# Patient Record
Sex: MALE | Race: WHITE | Employment: OTHER | URBAN - METROPOLITAN AREA
[De-identification: names, ages, dates, MRNs, and addresses within clinical notes are randomized per-mention and may not be internally consistent; named-entity substitution may affect disease eponyms.]

---

## 2018-07-18 LAB
LDL-C, EXTERNAL: 60
PSA, EXTERNAL: 0.8

## 2018-08-03 ENCOUNTER — HOSPITAL ENCOUNTER (OUTPATIENT)
Dept: LAB | Age: 83
Discharge: HOME | End: 2018-08-03
Payer: MEDICARE

## 2018-08-03 ENCOUNTER — OFFICE VISIT (OUTPATIENT)
Dept: ENDOCRINOLOGY | Age: 83
End: 2018-08-03

## 2018-08-03 VITALS
RESPIRATION RATE: 14 BRPM | WEIGHT: 162 LBS | SYSTOLIC BLOOD PRESSURE: 163 MMHG | HEART RATE: 54 BPM | DIASTOLIC BLOOD PRESSURE: 75 MMHG | BODY MASS INDEX: 24.55 KG/M2 | TEMPERATURE: 96.8 F | HEIGHT: 68 IN

## 2018-08-03 DIAGNOSIS — E03.4 HYPOTHYROIDISM DUE TO ACQUIRED ATROPHY OF THYROID: ICD-10-CM

## 2018-08-03 DIAGNOSIS — E87.5 HYPERKALEMIA: ICD-10-CM

## 2018-08-03 DIAGNOSIS — E04.2 MULTINODULAR GOITER: Primary | ICD-10-CM

## 2018-08-03 PROCEDURE — 80048 BASIC METABOLIC PNL TOTAL CA: CPT

## 2018-08-03 PROCEDURE — 36415 COLL VENOUS BLD VENIPUNCTURE: CPT

## 2018-08-03 RX ORDER — LISINOPRIL 10 MG/1
TABLET ORAL DAILY
COMMUNITY
End: 2020-09-21

## 2018-08-03 RX ORDER — LEVOTHYROXINE SODIUM 50 UG/1
TABLET ORAL
COMMUNITY
End: 2020-09-21

## 2018-08-03 RX ORDER — FENOFIBRATE 160 MG/1
160 TABLET ORAL DAILY
COMMUNITY
End: 2020-09-21

## 2018-08-03 NOTE — PROGRESS NOTES
HISTORY OF PRESENT ILLNESS Lorenzo Bonilla is a 80 y.o. male. HPI Initial visit for  MNG and hypothyroidism Referred by Dr. Eric Mejia He has been on thyroid supplementation for 5 years He is compliant with synthroid HE HAS STAYED EUTHYROID FOR ALL THIS TIME Pt has recently been hospitalized for paroxysmal afib PT HAD USG THYROID AND WAS FOUND TO HAVE THYROID NODULES Past Medical History:  
Diagnosis Date  CAD (coronary artery disease)   
 hypercholesterolemia  Cancer ([address] Prostate  GERD (gastroesophageal reflux disease)  Hypercholesteremia  Hypertension  Hypothyroid  Migraines  Peptic ulcer disease with hemorrhage  6 years ago Had gastric surgery in [place] Social History Social History  Marital status:  Spouse name: N/A  
 Number of children: N/A  
 Years of education: N/A Occupational History  Not on file. Social History Main Topics  Smoking status: Former Smoker  Smokeless tobacco: Never Used  Alcohol use No  
 Drug use: No  
 Sexual activity: No  
 
Other Topics Concern  Not on file Social History Narrative History reviewed. No pertinent family history. Review of Systems Constitutional: Negative. HENT: Negative. Eyes: Negative for pain and redness. Respiratory: Negative. Cardiovascular: Negative for chest pain, palpitations and leg swelling. Gastrointestinal: Negative. Negative for constipation. Genitourinary: Negative. Musculoskeletal: Negative for myalgias. Skin: Negative. Neurological: Negative. Endo/Heme/Allergies: Negative. Psychiatric/Behavioral: Negative for depression and memory loss. The patient does not have insomnia. Physical Exam  
Constitutional: He is oriented to person, place, and time. He appears well-developed and well-nourished. HENT:  
Head: Normocephalic.   
Eyes: Conjunctivae and EOM are normal. Pupils are equal, round, and reactive to light. Neck: Normal range of motion. Neck supple. No JVD present. No tracheal deviation present. No thyromegaly present. Cardiovascular: Normal rate, regular rhythm and normal heart sounds. Pulmonary/Chest: Effort normal and breath sounds normal.  
Abdominal: Soft. Bowel sounds are normal.  
Musculoskeletal: Normal range of motion. Lymphadenopathy:  
  He has no cervical adenopathy. Neurological: He is alert and oriented to person, place, and time. He has normal reflexes. Skin: Skin is warm. Psychiatric: He has a normal mood and affect. REVIEWED INFO  
 
ASSESSMENT and PLAN 1. MNG  : [facility]g from   July 2018  [facility]   
PT HAS 2 SMALL CYSTIC NODULES OF SIZES 3 MM ON LEFT SIDE   
PT IS EUTHYROID AND ASYMPTOMATIC He is reassured that the nodules are so small, that we need not do FNA on them He can f/u in 2-3 years to assess these nodule for growth 2. Hypothyroidism  :  TSH is 2.2 On synthroid 50 mcg a day Pt is taking it compliantly and can continue the same 3. Hyperkalemia : over 6  From   July 16 2018 Will repeat  
( he is on lisinopril ) 4. H/o Paroxysmal atrial arrythmia 5. H/o prostrate cancer  
 
> 50 % of time is spent on counseling Patient voiced understanding her plan of care F/u in 2 years and labs bvn

## 2018-08-03 NOTE — MR AVS SNAPSHOT
[address] [facility] [address] 
[phone] Patient: Leslie Darnell MRN: [id number] RRL:[id number] Visit Information Date & Time Provider Department Dept. Phone Encounter #  
 8/3/2018  1:00 PM Efraín Alvraez MD [facility] Diabetes & Endocrinology [phone] [id number] Follow-up Instructions Return in about 2 years (around 8/3/2020). Upcoming Health Maintenance Date Due DTaP/Tdap/Td series (1 - Tdap) 3/7/1953 ZOSTER VACCINE AGE 60> 1/7/1992 GLAUCOMA SCREENING Q2Y 3/7/1997 Pneumococcal 65+ Low/Medium Risk (1 of 2 - PCV13) 3/7/1997 MEDICARE YEARLY EXAM 3/14/2018 Influenza Age 5 to Adult 8/1/2018 Allergies as of 8/3/2018  Review Complete On: 8/3/2018 By: Efraín Alvarez MD  
  
 Severity Noted Reaction Type Reactions Aspirin  11/03/2011    Other (comments) Ulcers (bleeding) Current Immunizations  Reviewed on 10/8/2011 Name Date Influenza Vaccine Split 10/11/2011  9:30 AM  
  
 Not reviewed this visit You Were Diagnosed With   
  
 Codes Comments Hyperkalemia    -  Primary ICD-10-CM: E87.5 ICD-9-CM: 276.7 Multinodular goiter     ICD-10-CM: E04.2 ICD-9-CM: 241.1 Hypothyroidism due to acquired atrophy of thyroid     ICD-10-CM: E03.4 ICD-9-CM: 244.8, 246.8 Vitals BP Pulse Temp Resp Height(growth percentile) Weight(growth percentile) 163/75 (BP 1 Location: Right arm, BP Patient Position: Sitting) (!) 54 96.8 °F (36 °C) (Oral) 14 5' 8\" (1.727 m) 162 lb (73.5 kg) BMI Smoking Status 24.63 kg/m2 Former Smoker Vitals History BMI and BSA Data Body Mass Index Body Surface Area  
 24.63 kg/m 2 1.88 m 2 Preferred Pharmacy Pharmacy Name Phone [id number], [address] [phone] Your Updated Medication List  
  
   
 This list is accurate as of 8/3/18  1:37 PM.  Always use your most recent med list.  
  
  
  
  
 BUTALBITAL-ACETAMINOP-CAF-COD PO Take  by mouth. CRESTOR 20 mg tablet Generic drug:  rosuvastatin  
  
 fenofibrate 160 mg tablet Commonly known as:  LOFIBRA Take 160 mg by mouth daily. levothyroxine 50 mcg tablet Commonly known as:  SYNTHROID Take  by mouth Daily (before breakfast). lisinopril 10 mg tablet Commonly known as:  Yesenia Needy Take  by mouth daily. tamsulosin 0.4 mg capsule Commonly known as:  FLOMAX Take 0.4 mg by mouth daily. We Performed the Following METABOLIC PANEL, BASIC [91780 CPT(R)] Follow-up Instructions Return in about 2 years (around 8/3/2020). Patient Instructions Synthroid 50  mcg  A day, on empty stomach with water only, no other meds or food or drinks   For next half hour Take any kind of vitamins, calcium, iron   Pills  4 hours later Introducing [facility]! [employer] introduces [employer] patient portal. Now you can access parts of your medical record, email your doctor's office, and request medication refills online. 1. In your internet browser, go to [email or web address] 2. Click on the First Time User? Click Here link in the Sign In box. You will see the New Member Sign Up page. 3. Enter your [employer] Access Code exactly as it appears below. You will not need to use this code after youve completed the sign-up process. If you do not sign up before the expiration date, you must request a new code. · [employer] Access Code: [email or web address] Expires: 11/1/2018  1:37 PM 
 
4. Enter the last four digits of your Social Security Number (xxxx) and Date of Birth (mm/dd/yyyy) as indicated and click Submit. You will be taken to the next sign-up page. 5. Create a [employer] ID.  This will be your [employer] login ID and cannot be changed, so think of one that is secure and easy to remember. 6. Create a [employer] password. You can change your password at any time. 7. Enter your Password Reset Question and Answer. This can be used at a later time if you forget your password. 8. Enter your e-mail address. You will receive e-mail notification when new information is available in [address] 9. Click Sign Up. You can now view and download portions of your medical record. 10. Click the Download Summary menu link to download a portable copy of your medical information. If you have questions, please visit the Frequently Asked Questions section of the [employer] website. Remember, [employer] is NOT to be used for urgent needs. For medical emergencies, dial 911. Now available from your iPhone and Android! Please provide this summary of care documentation to your next provider. Your primary care clinician is listed as Nghia Pritchett. If you have any questions after today's visit, please call [phone].

## 2018-08-03 NOTE — PATIENT INSTRUCTIONS
Synthroid 50  mcg  A day, on empty stomach with water only, no other meds or food or drinks   For next half hour Take any kind of vitamins, calcium, iron   Pills  4 hours later

## 2018-08-03 NOTE — LETTER
8/19/2018 5:27 PM 
 
Patient:  Nadine Soler YOB: 1932 Date of Visit: 8/3/2018 Dear Eugenio Torres MD 
[facility] [place] [address] VIA Facsimile: [phone] 
 : Thank you for referring Mr. Sandra Cazares to me for evaluation/treatment. Below are the relevant portions of my assessment and plan of care. Chief Complaint Patient presents with  New Patient  Thyroid Problem HISTORY OF PRESENT ILLNESS Nadine Soler is a 80 y.o. male. HPI Initial visit for  MNG and hypothyroidism Referred by Dr. Robin Odonnell He has been on thyroid supplementation for 5 years He is compliant with synthroid HE HAS STAYED EUTHYROID FOR ALL THIS TIME Pt has recently been hospitalized for paroxysmal afib PT HAD USG THYROID AND WAS FOUND TO HAVE THYROID NODULES Past Medical History:  
Diagnosis Date  CAD (coronary artery disease)   
 hypercholesterolemia  Cancer ([address] Prostate  GERD (gastroesophageal reflux disease)  Hypercholesteremia  Hypertension  Hypothyroid  Migraines  Peptic ulcer disease with hemorrhage  6 years ago Had gastric surgery in [place] Social History Social History  Marital status:  Spouse name: N/A  
 Number of children: N/A  
 Years of education: N/A Occupational History  Not on file. Social History Main Topics  Smoking status: Former Smoker  Smokeless tobacco: Never Used  Alcohol use No  
 Drug use: No  
 Sexual activity: No  
 
Other Topics Concern  Not on file Social History Narrative History reviewed. No pertinent family history. Review of Systems Constitutional: Negative. HENT: Negative. Eyes: Negative for pain and redness. Respiratory: Negative. Cardiovascular: Negative for chest pain, palpitations and leg swelling. Gastrointestinal: Negative. Negative for constipation. Genitourinary: Negative. Musculoskeletal: Negative for myalgias. Skin: Negative. Neurological: Negative. Endo/Heme/Allergies: Negative. Psychiatric/Behavioral: Negative for depression and memory loss. The patient does not have insomnia. Physical Exam  
Constitutional: He is oriented to person, place, and time. He appears well-developed and well-nourished. HENT:  
Head: Normocephalic. Eyes: Conjunctivae and EOM are normal. Pupils are equal, round, and reactive to light. Neck: Normal range of motion. Neck supple. No JVD present. No tracheal deviation present. No thyromegaly present. Cardiovascular: Normal rate, regular rhythm and normal heart sounds. Pulmonary/Chest: Effort normal and breath sounds normal.  
Abdominal: Soft. Bowel sounds are normal.  
Musculoskeletal: Normal range of motion. Lymphadenopathy:  
  He has no cervical adenopathy. Neurological: He is alert and oriented to person, place, and time. He has normal reflexes. Skin: Skin is warm. Psychiatric: He has a normal mood and affect. REVIEWED INFO  
 
ASSESSMENT and PLAN 1. MNG  : usg from   July 2018  [facility]   
PT HAS 2 SMALL CYSTIC NODULES OF SIZES 3 MM ON LEFT SIDE   
PT IS EUTHYROID AND ASYMPTOMATIC He is reassured that the nodules are so small, that we need not do FNA on them He can f/u in 2-3 years to assess these nodule for growth 2. Hypothyroidism  :  TSH is 2.2 On synthroid 50 mcg a day Pt is taking it compliantly and can continue the same 3. Hyperkalemia : over 6  From   July 16 2018 Will repeat  
( he is on lisinopril ) 4. H/o Paroxysmal atrial arrythmia 5. H/o prostrate cancer  
 
> 50 % of time is spent on counseling Patient voiced understanding her plan of care F/u in 2 years and labs bvn If you have questions, please do not hesitate to call me. I look forward to following Mr. Harmony Carlos along with you. Sincerely, Efraín Alvarez MD

## 2018-08-04 LAB
BUN SERPL-MCNC: 19 MG/DL (ref 8–27)
BUN/CREAT SERPL: 18 (ref 10–24)
CALCIUM SERPL-MCNC: 9.9 MG/DL (ref 8.6–10.2)
CHLORIDE SERPL-SCNC: 106 MMOL/L (ref 96–106)
CO2 SERPL-SCNC: 23 MMOL/L (ref 20–29)
CREAT SERPL-MCNC: 1.05 MG/DL (ref 0.76–1.27)
GLUCOSE SERPL-MCNC: 87 MG/DL (ref 65–99)
POTASSIUM SERPL-SCNC: 5.3 MMOL/L (ref 3.5–5.2)
SODIUM SERPL-SCNC: 141 MMOL/L (ref 134–144)

## 2018-08-08 ENCOUNTER — TELEPHONE (OUTPATIENT)
Dept: ENDOCRINOLOGY | Age: 83
End: 2018-08-08

## 2018-08-08 NOTE — TELEPHONE ENCOUNTER
Other than mild elevation of potassium tests are normal     Avoid  foods which has high potassium -  bananas, oranges, potatoes, spinach and tomatoes.     Dr Joni Rose will address further

## 2020-07-23 VITALS
DIASTOLIC BLOOD PRESSURE: 84 MMHG | BODY MASS INDEX: 23.19 KG/M2 | WEIGHT: 153 LBS | RESPIRATION RATE: 16 BRPM | OXYGEN SATURATION: 99 % | TEMPERATURE: 97.4 F | SYSTOLIC BLOOD PRESSURE: 136 MMHG | HEART RATE: 68 BPM | HEIGHT: 68 IN

## 2020-07-23 VITALS
SYSTOLIC BLOOD PRESSURE: 136 MMHG | HEIGHT: 68 IN | BODY MASS INDEX: 23.19 KG/M2 | WEIGHT: 153 LBS | DIASTOLIC BLOOD PRESSURE: 84 MMHG | HEART RATE: 68 BPM | OXYGEN SATURATION: 99 % | RESPIRATION RATE: 16 BRPM | TEMPERATURE: 97.4 F

## 2020-08-05 ENCOUNTER — TELEPHONE (OUTPATIENT)
Dept: PRIMARY CARE CLINIC | Age: 85
End: 2020-08-05

## 2020-08-05 DIAGNOSIS — F51.01 PRIMARY INSOMNIA: Primary | ICD-10-CM

## 2020-08-05 DIAGNOSIS — R51.9 HEADACHE DISORDER: ICD-10-CM

## 2020-08-05 NOTE — TELEPHONE ENCOUNTER
Rebeka Mauricio called in behalf of her , Jayden Morel,  He needs his, Butalbital-acetaminophen- caffeine 50 mg 325 mg- 40 mg tabs and Zolpidem 10 mg.  [facility] is the pharmacy usesl

## 2020-08-06 RX ORDER — BUTALBITAL, ACETAMINOPHEN AND CAFFEINE 50; 325; 40 MG/1; MG/1; MG/1
1 TABLET ORAL
Qty: 60 TAB | Refills: 0 | Status: SHIPPED | OUTPATIENT
Start: 2020-08-06 | End: 2020-09-21 | Stop reason: SDUPTHER

## 2020-08-06 RX ORDER — ZOLPIDEM TARTRATE 10 MG/1
TABLET ORAL
COMMUNITY
Start: 2020-05-11 | End: 2020-08-06 | Stop reason: SDUPTHER

## 2020-08-06 RX ORDER — ZOLPIDEM TARTRATE 10 MG/1
10 TABLET ORAL
Qty: 30 TAB | Refills: 0 | Status: SHIPPED | OUTPATIENT
Start: 2020-08-06 | End: 2020-09-21 | Stop reason: SDUPTHER

## 2020-08-06 RX ORDER — BUTALBITAL, ACETAMINOPHEN AND CAFFEINE 50; 325; 40 MG/1; MG/1; MG/1
TABLET ORAL
COMMUNITY
Start: 2020-06-16 | End: 2020-08-06 | Stop reason: SDUPTHER

## 2020-08-06 NOTE — TELEPHONE ENCOUNTER
This is the  of the rude patient who came in here on weds. And cussed out alfonso. He has an appt weds but she walked out and told alfonso \"f**ck you\" Not sure what you wanted to do or if he was discharge?

## 2020-09-21 ENCOUNTER — OFFICE VISIT (OUTPATIENT)
Dept: PRIMARY CARE CLINIC | Age: 85
End: 2020-09-21
Payer: MEDICARE

## 2020-09-21 VITALS
SYSTOLIC BLOOD PRESSURE: 128 MMHG | OXYGEN SATURATION: 98 % | WEIGHT: 154.8 LBS | TEMPERATURE: 97.5 F | BODY MASS INDEX: 23.46 KG/M2 | HEART RATE: 66 BPM | HEIGHT: 68 IN | DIASTOLIC BLOOD PRESSURE: 82 MMHG

## 2020-09-21 DIAGNOSIS — R51.9 HEADACHE DISORDER: Chronic | ICD-10-CM

## 2020-09-21 DIAGNOSIS — R30.0 DYSURIA: ICD-10-CM

## 2020-09-21 DIAGNOSIS — I10 ESSENTIAL (PRIMARY) HYPERTENSION: Primary | Chronic | ICD-10-CM

## 2020-09-21 DIAGNOSIS — F51.01 PRIMARY INSOMNIA: Chronic | ICD-10-CM

## 2020-09-21 DIAGNOSIS — N40.1 BPH WITH URINARY OBSTRUCTION: ICD-10-CM

## 2020-09-21 DIAGNOSIS — N13.8 BPH WITH URINARY OBSTRUCTION: ICD-10-CM

## 2020-09-21 DIAGNOSIS — Z86.39 HISTORY OF HYPOTHYROIDISM: ICD-10-CM

## 2020-09-21 PROCEDURE — 99214 OFFICE O/P EST MOD 30 MIN: CPT | Performed by: FAMILY MEDICINE

## 2020-09-21 RX ORDER — AMLODIPINE BESYLATE 5 MG/1
5 TABLET ORAL DAILY
Qty: 90 TAB | Refills: 1 | Status: SHIPPED | OUTPATIENT
Start: 2020-09-21 | End: 2021-03-08 | Stop reason: SDUPTHER

## 2020-09-21 RX ORDER — BUTALBITAL, ACETAMINOPHEN AND CAFFEINE 50; 325; 40 MG/1; MG/1; MG/1
1 TABLET ORAL
Qty: 60 TAB | Refills: 2 | Status: SHIPPED | OUTPATIENT
Start: 2020-09-21 | End: 2020-12-04

## 2020-09-21 RX ORDER — AMLODIPINE BESYLATE 5 MG/1
1 TABLET ORAL DAILY
COMMUNITY
Start: 2020-07-01 | End: 2020-09-21 | Stop reason: SDUPTHER

## 2020-09-21 RX ORDER — ZOLPIDEM TARTRATE 10 MG/1
10 TABLET ORAL
Qty: 90 TAB | Refills: 1 | Status: SHIPPED | OUTPATIENT
Start: 2020-09-21 | End: 2021-03-08 | Stop reason: SDUPTHER

## 2020-09-21 RX ORDER — TAMSULOSIN HYDROCHLORIDE 0.4 MG/1
0.4 CAPSULE ORAL DAILY
Qty: 90 CAP | Refills: 1 | Status: SHIPPED | OUTPATIENT
Start: 2020-09-21 | End: 2021-03-08 | Stop reason: SDUPTHER

## 2020-09-22 LAB
ALBUMIN SERPL-MCNC: 4.3 G/DL (ref 3.6–4.6)
ALBUMIN/GLOB SERPL: 1.9 {RATIO} (ref 1.2–2.2)
ALP SERPL-CCNC: 79 IU/L (ref 39–117)
ALT SERPL-CCNC: 12 IU/L (ref 0–44)
AST SERPL-CCNC: 23 IU/L (ref 0–40)
BASOPHILS # BLD AUTO: 0 X10E3/UL (ref 0–0.2)
BASOPHILS NFR BLD AUTO: 0 %
BILIRUB SERPL-MCNC: 0.2 MG/DL (ref 0–1.2)
BUN SERPL-MCNC: 12 MG/DL (ref 8–27)
BUN/CREAT SERPL: 12 (ref 10–24)
CALCIUM SERPL-MCNC: 10.2 MG/DL (ref 8.6–10.2)
CHLORIDE SERPL-SCNC: 103 MMOL/L (ref 96–106)
CO2 SERPL-SCNC: 21 MMOL/L (ref 20–29)
CREAT SERPL-MCNC: 0.97 MG/DL (ref 0.76–1.27)
EOSINOPHIL # BLD AUTO: 0.1 X10E3/UL (ref 0–0.4)
EOSINOPHIL NFR BLD AUTO: 1 %
ERYTHROCYTE [DISTWIDTH] IN BLOOD BY AUTOMATED COUNT: 12.9 % (ref 11.6–15.4)
GLOBULIN SER CALC-MCNC: 2.3 G/DL (ref 1.5–4.5)
GLUCOSE SERPL-MCNC: 103 MG/DL (ref 65–99)
HCT VFR BLD AUTO: 39 % (ref 37.5–51)
HGB BLD-MCNC: 13.6 G/DL (ref 13–17.7)
IMM GRANULOCYTES # BLD AUTO: 0 X10E3/UL (ref 0–0.1)
IMM GRANULOCYTES NFR BLD AUTO: 0 %
LYMPHOCYTES # BLD AUTO: 2 X10E3/UL (ref 0.7–3.1)
LYMPHOCYTES NFR BLD AUTO: 22 %
MCH RBC QN AUTO: 33.3 PG (ref 26.6–33)
MCHC RBC AUTO-ENTMCNC: 34.9 G/DL (ref 31.5–35.7)
MCV RBC AUTO: 96 FL (ref 79–97)
MONOCYTES # BLD AUTO: 0.8 X10E3/UL (ref 0.1–0.9)
MONOCYTES NFR BLD AUTO: 9 %
MORPHOLOGY BLD-IMP: ABNORMAL
NEUTROPHILS # BLD AUTO: 6.1 X10E3/UL (ref 1.4–7)
NEUTROPHILS NFR BLD AUTO: 68 %
PLATELET # BLD AUTO: 120 X10E3/UL (ref 150–450)
POTASSIUM SERPL-SCNC: 4.9 MMOL/L (ref 3.5–5.2)
PROT SERPL-MCNC: 6.6 G/DL (ref 6–8.5)
RBC # BLD AUTO: 4.08 X10E6/UL (ref 4.14–5.8)
SODIUM SERPL-SCNC: 139 MMOL/L (ref 134–144)
TSH SERPL DL<=0.005 MIU/L-ACNC: 4.6 UIU/ML (ref 0.45–4.5)
WBC # BLD AUTO: 9.1 X10E3/UL (ref 3.4–10.8)

## 2020-09-22 NOTE — PROGRESS NOTES
HISTORY OF PRESENT ILLNESS  Hypertension:  PT taking amlodipine 5 mg. He is follwed by cardiology and he treats as well as doing  the cholesterol levels. Not checking Bp at home. Insomnia - on zolpidem for years w/o incident. No oversedation. Hypothyroid - reports today that he was taken of meds. Migraines - been on fioricet for years. I was able to get him off the codeine version yrs ago. Says sues 1-2 per day/ even some days may skip. BPH - ok on flomax. Helps the urgency and stream.      Alecia Fulton is a 80 y.o. male. Past Medical History:   Diagnosis Date    CAD (coronary artery disease)     hypercholesterolemia    Cancer ([facility] [address]     Prostate    GERD (gastroesophageal reflux disease)     Hypercholesteremia     Hypertension     Hypothyroid     Migraines     Peptic ulcer disease with hemorrhage  6 years ago    Had gastric surgery in [place]     Social History     Tobacco Use    Smoking status: Former Smoker    Smokeless tobacco: Never Used   Substance Use Topics    Alcohol use: No    Drug use: No       Family History   Problem Relation Age of Onset    Heart Attack Other        Review of Systems   Constitutional: Negative. Negative for chills, fever and weight loss. Eyes: Negative for blurred vision. Respiratory: Negative for cough, sputum production and shortness of breath. Cardiovascular: Negative for chest pain, palpitations and leg swelling. Gastrointestinal: Negative for abdominal pain, constipation, diarrhea and heartburn. Genitourinary: Negative for dysuria. Neurological: Positive for headaches. Negative for dizziness, tremors and weakness. Psychiatric/Behavioral: Negative for depression. The patient has insomnia. The patient is not nervous/anxious. Physical Exam  Constitutional:       General: He is not in acute distress. Appearance: Normal appearance. He is obese. He is not ill-appearing.    HENT:      Head: Normocephalic and atraumatic. Eyes:      General: No scleral icterus. Extraocular Movements: Extraocular movements intact. Conjunctiva/sclera: Conjunctivae normal.   Cardiovascular:      Rate and Rhythm: Normal rate and regular rhythm. Pulses: Normal pulses. Heart sounds: Normal heart sounds. Pulmonary:      Effort: Pulmonary effort is normal. No respiratory distress. Breath sounds: Normal breath sounds. No wheezing or rales. Musculoskeletal:      Right lower leg: No edema. Left lower leg: No edema. Lymphadenopathy:      Cervical: No cervical adenopathy. Skin:     General: Skin is warm. Capillary Refill: Capillary refill takes less than 2 seconds. Neurological:      General: No focal deficit present. Mental Status: He is alert and oriented to person, place, and time. Cranial Nerves: No cranial nerve deficit. Psychiatric:         Mood and Affect: Mood normal.         Behavior: Behavior normal.         Thought Content: Thought content normal.         Judgment: Judgment normal.           ASSESSMENT and PLAN  Diagnoses and all orders for this visit:    1. Essential (primary) hypertension  Comments:  at goal  Orders:  -     amLODIPine (NORVASC) 5 mg tablet; Take 1 Tab by mouth daily.  -     METABOLIC PANEL, COMPREHENSIVE  -     CBC WITH AUTOMATED DIFF    2. Headache disorder  Comments:  chrnic on prn fioricet for years. Orders:  -     butalbital-acetaminophen-caffeine (FIORICET, ESGIC) -40 mg per tablet; Take 1 Tab by mouth every six (6) hours as needed for Headache.  -     METABOLIC PANEL, COMPREHENSIVE  -     CBC WITH AUTOMATED DIFF    3. Primary insomnia  Comments:  ok on ambien X yrs  Orders:  -     zolpidem (AMBIEN) 10 mg tablet; Take 1 Tab by mouth nightly as needed for Sleep. Max Daily Amount: 10 mg.    4. BPH with urinary obstruction  -     METABOLIC PANEL, COMPREHENSIVE  -     CBC WITH AUTOMATED DIFF    5.  History of hypothyroidism  Comments:  off meds for some time.  says no longer needed , wife confirmed  Orders:  -     TSH 3RD GENERATION    6. Dysuria  -     tamsulosin (FLOMAX) 0.4 mg capsule; Take 1 Cap by mouth daily. Orders Placed This Encounter    METABOLIC PANEL, COMPREHENSIVE    CBC WITH AUTOMATED DIFF    TSH 3RD GENERATION    DISCONTD: amLODIPine (NORVASC) 5 mg tablet    amLODIPine (NORVASC) 5 mg tablet    butalbital-acetaminophen-caffeine (FIORICET, ESGIC) -40 mg per tablet    tamsulosin (FLOMAX) 0.4 mg capsule    zolpidem (AMBIEN) 10 mg tablet     Follow-up and Dispositions    · Return in about 3 months (around 12/21/2020) for Chronic office visit.

## 2020-09-24 ENCOUNTER — TELEPHONE (OUTPATIENT)
Dept: PRIMARY CARE CLINIC | Age: 85
End: 2020-09-24

## 2020-09-24 NOTE — PROGRESS NOTES
Normal  blood count, normal liver and kidney function and normal blood sugar. Thyroid function  Ok, slight Tsh probably due to kidney function issues.

## 2020-09-25 ENCOUNTER — TELEPHONE (OUTPATIENT)
Dept: PRIMARY CARE CLINIC | Age: 85
End: 2020-09-25

## 2020-09-25 DIAGNOSIS — K59.1 FUNCTIONAL DIARRHEA: Primary | ICD-10-CM

## 2020-09-28 NOTE — TELEPHONE ENCOUNTER
Pt wife states he always have stomach issues. He has had multiple surgery and ruled out multiple medical problems. Pt wife states this always happens. There was no change in any of his medications, on the same medications.  Pt wife states otc medication doesn't work and is asking for something to be prescribed

## 2020-09-29 RX ORDER — DIPHENOXYLATE HYDROCHLORIDE AND ATROPINE SULFATE 2.5; .025 MG/1; MG/1
1 TABLET ORAL
Qty: 60 TAB | Refills: 1 | Status: SHIPPED | OUTPATIENT
Start: 2020-09-29 | End: 2021-06-21 | Stop reason: SDUPTHER

## 2020-09-29 NOTE — TELEPHONE ENCOUNTER
PT has hx of partial gastrectomy an so sounds like he has dumping syndrome. This is actually [id number] [facility] pt not mine. He only saw me bc of scheduling issues. I can only think of imodium which is OTC? Any thoughts?

## 2020-10-12 ENCOUNTER — TELEPHONE (OUTPATIENT)
Dept: PRIMARY CARE CLINIC | Age: 85
End: 2020-10-12

## 2020-10-12 NOTE — TELEPHONE ENCOUNTER
pts wife called really concerned , her  had a appt a few months ago and discussed a little red place on his back now it is huge she says and oozing, he wont go to the er he asked to see you. ..  He also has sabra on arm wife is scared that it may be cancer

## 2020-10-14 ENCOUNTER — OFFICE VISIT (OUTPATIENT)
Dept: PRIMARY CARE CLINIC | Age: 85
End: 2020-10-14
Payer: MEDICARE

## 2020-10-14 VITALS
SYSTOLIC BLOOD PRESSURE: 151 MMHG | OXYGEN SATURATION: 97 % | DIASTOLIC BLOOD PRESSURE: 82 MMHG | RESPIRATION RATE: 18 BRPM | HEIGHT: 68 IN | BODY MASS INDEX: 22.73 KG/M2 | TEMPERATURE: 97.4 F | HEART RATE: 62 BPM | WEIGHT: 150 LBS

## 2020-10-14 DIAGNOSIS — M25.421 EFFUSION OF RIGHT ELBOW: Primary | ICD-10-CM

## 2020-10-14 DIAGNOSIS — L98.9 SKIN LESION: ICD-10-CM

## 2020-10-14 PROCEDURE — G8427 DOCREV CUR MEDS BY ELIG CLIN: HCPCS | Performed by: NURSE PRACTITIONER

## 2020-10-14 PROCEDURE — 20605 DRAIN/INJ JOINT/BURSA W/O US: CPT | Performed by: NURSE PRACTITIONER

## 2020-10-14 PROCEDURE — G8536 NO DOC ELDER MAL SCRN: HCPCS | Performed by: NURSE PRACTITIONER

## 2020-10-14 PROCEDURE — 99212 OFFICE O/P EST SF 10 MIN: CPT | Performed by: NURSE PRACTITIONER

## 2020-10-14 PROCEDURE — G8420 CALC BMI NORM PARAMETERS: HCPCS | Performed by: NURSE PRACTITIONER

## 2020-10-14 PROCEDURE — 1101F PT FALLS ASSESS-DOCD LE1/YR: CPT | Performed by: NURSE PRACTITIONER

## 2020-10-14 PROCEDURE — G8432 DEP SCR NOT DOC, RNG: HCPCS | Performed by: NURSE PRACTITIONER

## 2020-10-14 RX ORDER — LATANOPROST 50 UG/ML
SOLUTION/ DROPS OPHTHALMIC
COMMUNITY
Start: 2020-09-22

## 2020-10-14 RX ORDER — FLUTICASONE PROPIONATE 50 MCG
SPRAY, SUSPENSION (ML) NASAL
COMMUNITY
Start: 2020-07-12 | End: 2021-04-15

## 2020-10-14 RX ORDER — FENOFIBRATE 160 MG/1
TABLET ORAL
COMMUNITY
Start: 2020-09-22 | End: 2020-11-13

## 2020-10-14 RX ORDER — LISINOPRIL 10 MG/1
TABLET ORAL
COMMUNITY
Start: 2020-09-22 | End: 2020-11-13

## 2020-10-14 RX ORDER — TIMOLOL MALEATE 5 MG/ML
SOLUTION/ DROPS OPHTHALMIC
COMMUNITY
Start: 2020-07-12

## 2020-10-14 RX ORDER — LEVOTHYROXINE SODIUM 50 UG/1
TABLET ORAL
COMMUNITY
Start: 2020-09-22 | End: 2020-11-13

## 2020-10-14 RX ORDER — PANTOPRAZOLE SODIUM 40 MG/1
TABLET, DELAYED RELEASE ORAL
COMMUNITY
Start: 2020-09-22 | End: 2021-05-26

## 2020-10-15 NOTE — PROGRESS NOTES
HISTORY OF PRESENT ILLNESS  Jannette Vang is a 80 y.o. male presents for   Chief Complaint   Patient presents with    Elbow Pain    Back Pain     pt fall and pull skin off of back     Several day issue of right elbow swelling after falling     Also left lower back abrasion/spot from a long time ago (about 12 years) with a history of scabbing/healing and scabbing again now skin is elevated and raised              Vitals:    10/14/20 1428   BP: (!) 151/82   BP 1 Location: Left arm   BP Patient Position: Sitting   Pulse: 62   Resp: 18   Temp: 97.4 °F (36.3 °C)   TempSrc: Temporal   SpO2: 97%   Weight: 150 lb (68 kg)   Height: 5' 8\" (1.727 m)      Patient Active Problem List   Diagnosis Code    GI (gastrointestinal bleed) K92.2    Acute posthemorrhagic anemia D62    SVT [facility]supraventricular tachycardia) ([facility]) I47.1    Hypotension, unspecified I95.9    BRETT (acute kidney injury) ([facility]) N17.9    Unspecified hypothyroidism E03.9    Chest pain, unspecified R07.9    Peptic ulcer disease with hemorrhage K27.4    Paroxysmal SVT (supraventricular tachycardia) ([facility]) I47.1    PAF (paroxysmal atrial fibrillation) ([facility]) I48.0    Essential (primary) hypertension I10    Headache disorder R51.9    Primary insomnia F51.01    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, N13.8     Patient Active Problem List    Diagnosis Date Noted    Essential (primary) hypertension 09/21/2020    Headache disorder 09/21/2020    Primary insomnia 09/21/2020    BPH with urinary obstruction 09/21/2020    Paroxysmal SVT (supraventricular tachycardia) ([facility]) 10/08/2011    PAF (paroxysmal atrial fibrillation) ([facility] [address] 10/08/2011    Peptic ulcer disease with hemorrhage     GI (gastrointestinal bleed) 10/07/2011    Acute posthemorrhagic anemia 10/07/2011    SVT (supraventricular tachycardia) ([facility] [address] 10/07/2011    Hypotension, unspecified 10/07/2011    BRETT (acute kidney injury) ([facility] 10/07/2011    Unspecified hypothyroidism 10/07/2011    Chest pain, unspecified 10/07/2011     Current Outpatient Medications   Medication Sig Dispense Refill    fenofibrate (LOFIBRA) 160 mg tablet       fluticasone propionate (FLONASE) 50 mcg/actuation nasal spray       latanoprost (XALATAN) 0.005 % ophthalmic solution       levothyroxine (SYNTHROID) 50 mcg tablet       lisinopriL (PRINIVIL, ZESTRIL) 10 mg tablet       pantoprazole (PROTONIX) 40 mg tablet       timolol (TIMOPTIC) 0.5 % ophthalmic solution       diphenoxylate-atropine (LomotiL) 2.5-0.025 mg per tablet Take 1 Tab by mouth four (4) times daily as needed for Diarrhea. Max Daily Amount: 4 Tabs. 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 90 Tab 1    butalbital-acetaminophen-caffeine (FIORICET, ESGIC) -40 mg per tablet Take 1 Tab by mouth every six (6) hours as needed for Headache. 60 Tab 2    tamsulosin (FLOMAX) 0.4 mg capsule Take 1 Cap by mouth daily. 90 Cap 1    zolpidem (AMBIEN) 10 mg tablet Take 1 Tab by mouth nightly as needed for Sleep.  Max Daily Amount: 10 mg. 90 Tab 1     Allergies   Allergen Reactions    Aspirin Other (comments)     Ulcers (bleeding)    Nsaids (Non-Steroidal Anti-Inflammatory Drug) Other (comments)     Ulcers       Past Medical History:   Diagnosis Date    CAD (coronary artery disease)     hypercholesterolemia    Cancer ([facility] [address]     Prostate    Functional diarrhea     hx of partial gastrectomy    GERD (gastroesophageal reflux disease)     Hypercholesteremia     Hypertension     Hypothyroid     Migraines     Peptic ulcer disease with hemorrhage  6 years ago    Had gastric surgery in [place]     Past Surgical History:   Procedure Laterality Date    ABDOMEN SURGERY PROC UNLISTED      HX GASTRECTOMY  2006    Partial gastectmy     Family History   Problem Relation Age of Onset    Heart Attack Other      Social History     Tobacco Use    Smoking status: Former Smoker    Smokeless tobacco: Never Used   Substance Use Topics    Alcohol use: No           Review of Systems   Constitutional: Negative for fever. Respiratory: Negative for cough. Cardiovascular: Negative for chest pain. Musculoskeletal: Positive for back pain (skin) and joint pain (right elbow with redness and swelling). Neurological: Negative for dizziness. Physical Exam  Vitals signs reviewed. Constitutional:       Appearance: Normal appearance. He is normal weight. HENT:      Head: Normocephalic. Nose: Nose normal.      Mouth/Throat:      Mouth: Mucous membranes are moist.   Eyes:      Extraocular Movements: Extraocular movements intact. Pupils: Pupils are equal, round, and reactive to light. Neck:      Musculoskeletal: Normal range of motion and neck supple. Cardiovascular:      Rate and Rhythm: Normal rate and regular rhythm. Pulses: Normal pulses. Heart sounds: Normal heart sounds. Pulmonary:      Effort: Pulmonary effort is normal.      Breath sounds: Normal breath sounds. Musculoskeletal: Normal range of motion. General: Swelling and tenderness present. Skin:     General: Skin is warm and dry. Findings: Lesion present. Comments: Mushroom like looking 2-3 cm of skin lesion    Neurological:      General: No focal deficit present. Mental Status: He is alert and oriented to person, place, and time. Psychiatric:         Attention and Perception: Attention and perception normal.         Mood and Affect: Affect normal. Mood is depressed. Speech: Speech normal.         Behavior: Behavior normal. Behavior is cooperative. Thought Content: Thought content normal.         Cognition and Memory: Cognition and memory normal.         Judgment: Judgment normal.           ASSESSMENT and PLAN  Diagnoses and all orders for this visit:    1.  Effusion of right elbow  Drained with 1% lido anesthesia betadine prep; drained 5.5 cc of serosanguinous fluid which deflated swelling applied 4\" ace wrap     2. Skin lesion  Sending      There are no diagnoses linked to this encounter.    Tri David, NP

## 2020-10-20 ENCOUNTER — TELEPHONE (OUTPATIENT)
Dept: PRIMARY CARE CLINIC | Age: 85
End: 2020-10-20

## 2020-10-20 NOTE — TELEPHONE ENCOUNTER
They went to dermatology to get the place on his back removed and sent to testing, he still has fluid in his elbow they asked if they should come in and get it drained?

## 2020-10-26 ENCOUNTER — OFFICE VISIT (OUTPATIENT)
Dept: PRIMARY CARE CLINIC | Age: 85
End: 2020-10-26

## 2020-10-26 VITALS
SYSTOLIC BLOOD PRESSURE: 110 MMHG | HEIGHT: 68 IN | BODY MASS INDEX: 22.73 KG/M2 | OXYGEN SATURATION: 97 % | HEART RATE: 57 BPM | RESPIRATION RATE: 18 BRPM | TEMPERATURE: 98.6 F | DIASTOLIC BLOOD PRESSURE: 76 MMHG | WEIGHT: 150 LBS

## 2020-11-13 RX ORDER — FENOFIBRATE 160 MG/1
TABLET ORAL
Qty: 90 TAB | Refills: 1 | Status: SHIPPED | OUTPATIENT
Start: 2020-11-13 | End: 2021-03-08 | Stop reason: SDUPTHER

## 2020-11-13 RX ORDER — LISINOPRIL 10 MG/1
TABLET ORAL
Qty: 90 TAB | Refills: 1 | Status: SHIPPED | OUTPATIENT
Start: 2020-11-13 | End: 2021-03-08 | Stop reason: SDUPTHER

## 2020-11-13 RX ORDER — LEVOTHYROXINE SODIUM 50 UG/1
TABLET ORAL
Qty: 90 TAB | Refills: 1 | Status: SHIPPED | OUTPATIENT
Start: 2020-11-13 | End: 2021-03-08 | Stop reason: SDUPTHER

## 2020-12-03 DIAGNOSIS — R51.9 HEADACHE DISORDER: Chronic | ICD-10-CM

## 2020-12-04 RX ORDER — BUTALBITAL, ACETAMINOPHEN AND CAFFEINE 50; 325; 40 MG/1; MG/1; MG/1
TABLET ORAL
Qty: 60 TAB | Refills: 2 | Status: SHIPPED | OUTPATIENT
Start: 2020-12-04 | End: 2021-01-14 | Stop reason: SDUPTHER

## 2021-01-13 ENCOUNTER — TELEPHONE (OUTPATIENT)
Dept: PRIMARY CARE CLINIC | Age: 86
End: 2021-01-13

## 2021-01-13 DIAGNOSIS — R51.9 HEADACHE DISORDER: Chronic | ICD-10-CM

## 2021-01-13 DIAGNOSIS — R51.9 HEADACHE DISORDER: Primary | Chronic | ICD-10-CM

## 2021-01-13 NOTE — TELEPHONE ENCOUNTER
Patient's wife called and stated that his [facility] called and said that the vendor they worked with will no longer work with them and he needs his butalbital sent into [facility] on [address]. If there are any questions they can be reached at [phone].

## 2021-01-14 RX ORDER — BUTALBITAL, ACETAMINOPHEN AND CAFFEINE 50; 325; 40 MG/1; MG/1; MG/1
TABLET ORAL
Qty: 60 TAB | Refills: 2 | Status: SHIPPED | OUTPATIENT
Start: 2021-01-14 | End: 2021-03-08 | Stop reason: SDUPTHER

## 2021-02-16 ENCOUNTER — TELEPHONE (OUTPATIENT)
Dept: PRIMARY CARE CLINIC | Age: 86
End: 2021-02-16

## 2021-03-08 ENCOUNTER — OFFICE VISIT (OUTPATIENT)
Dept: PRIMARY CARE CLINIC | Age: 86
End: 2021-03-08
Payer: MEDICARE

## 2021-03-08 VITALS
SYSTOLIC BLOOD PRESSURE: 196 MMHG | DIASTOLIC BLOOD PRESSURE: 80 MMHG | HEIGHT: 68 IN | HEART RATE: 58 BPM | TEMPERATURE: 98.6 F | WEIGHT: 153 LBS | RESPIRATION RATE: 16 BRPM | BODY MASS INDEX: 23.19 KG/M2

## 2021-03-08 DIAGNOSIS — I10 ESSENTIAL (PRIMARY) HYPERTENSION: Chronic | ICD-10-CM

## 2021-03-08 DIAGNOSIS — E78.2 MIXED HYPERLIPIDEMIA: ICD-10-CM

## 2021-03-08 DIAGNOSIS — R30.0 DYSURIA: ICD-10-CM

## 2021-03-08 DIAGNOSIS — F51.01 PRIMARY INSOMNIA: Chronic | ICD-10-CM

## 2021-03-08 DIAGNOSIS — E03.9 ACQUIRED HYPOTHYROIDISM: Primary | ICD-10-CM

## 2021-03-08 DIAGNOSIS — N40.0 BENIGN PROSTATIC HYPERPLASIA, UNSPECIFIED WHETHER LOWER URINARY TRACT SYMPTOMS PRESENT: ICD-10-CM

## 2021-03-08 DIAGNOSIS — R51.9 HEADACHE DISORDER: Chronic | ICD-10-CM

## 2021-03-08 PROCEDURE — 1101F PT FALLS ASSESS-DOCD LE1/YR: CPT | Performed by: NURSE PRACTITIONER

## 2021-03-08 PROCEDURE — G8420 CALC BMI NORM PARAMETERS: HCPCS | Performed by: NURSE PRACTITIONER

## 2021-03-08 PROCEDURE — G8432 DEP SCR NOT DOC, RNG: HCPCS | Performed by: NURSE PRACTITIONER

## 2021-03-08 PROCEDURE — 99214 OFFICE O/P EST MOD 30 MIN: CPT | Performed by: NURSE PRACTITIONER

## 2021-03-08 PROCEDURE — G8427 DOCREV CUR MEDS BY ELIG CLIN: HCPCS | Performed by: NURSE PRACTITIONER

## 2021-03-08 PROCEDURE — G8536 NO DOC ELDER MAL SCRN: HCPCS | Performed by: NURSE PRACTITIONER

## 2021-03-08 RX ORDER — LISINOPRIL 10 MG/1
TABLET ORAL
Qty: 90 TAB | Refills: 1 | Status: SHIPPED | OUTPATIENT
Start: 2021-03-08

## 2021-03-08 RX ORDER — TAMSULOSIN HYDROCHLORIDE 0.4 MG/1
0.4 CAPSULE ORAL DAILY
Qty: 90 CAP | Refills: 1 | Status: SHIPPED | OUTPATIENT
Start: 2021-03-08 | End: 2021-05-26

## 2021-03-08 RX ORDER — LEVOTHYROXINE SODIUM 50 UG/1
TABLET ORAL
Qty: 90 TAB | Refills: 1 | Status: SHIPPED | OUTPATIENT
Start: 2021-03-08

## 2021-03-08 RX ORDER — AMLODIPINE BESYLATE 5 MG/1
5 TABLET ORAL DAILY
Qty: 90 TAB | Refills: 1 | Status: SHIPPED | OUTPATIENT
Start: 2021-03-08 | End: 2021-05-26

## 2021-03-08 RX ORDER — ZOLPIDEM TARTRATE 10 MG/1
10 TABLET ORAL
Qty: 90 TAB | Refills: 1 | Status: SHIPPED | OUTPATIENT
Start: 2021-03-08 | End: 2021-08-30 | Stop reason: SDUPTHER

## 2021-03-08 RX ORDER — BUTALBITAL, ACETAMINOPHEN AND CAFFEINE 50; 325; 40 MG/1; MG/1; MG/1
TABLET ORAL
Qty: 60 TAB | Refills: 2 | Status: SHIPPED | OUTPATIENT
Start: 2021-03-08 | End: 2021-05-26

## 2021-03-08 RX ORDER — FENOFIBRATE 160 MG/1
TABLET ORAL
Qty: 90 TAB | Refills: 1 | Status: SHIPPED | OUTPATIENT
Start: 2021-03-08

## 2021-03-08 NOTE — PROGRESS NOTES
HISTORY OF PRESENT ILLNESS  Edward Lugo is a 80 y.o. male presents to the office for medication refill and lab work    . Hypertension: Patients hypertension is well controlled on regimen of Lisinopril and amlodipine. Denies headaches, blurred vision or dizziness. . Hyperlipidemia: Mixed hyperlipidemia well controlled on fenofibrate. Denies any complications from medications. thyroid follow up; denies palpitations chest pain excessive thirst sleeping difficulties, bowel movement issues that are new    Follow up on insomnia.  Does well on ambien 10      Vitals:    03/08/21 1543   BP: (!) 196/80   Pulse: (!) 58   Resp: 16   Temp: 98.6 °F (37 °C)   TempSrc: Temporal   Weight: 153 lb (69.4 kg)   Height: 5' 8\" (1.727 m)     Patient Active Problem List   Diagnosis Code    GI (gastrointestinal bleed) K92.2    Acute posthemorrhagic anemia D62    Hypotension, unspecified I95.9    BRETT (acute kidney injury) ([facility] [address] N17.9    Unspecified hypothyroidism E03.9    Chest pain, unspecified R07.9    Peptic ulcer disease with hemorrhage K27.4    Essential (primary) hypertension I10    Headache disorder R51.9    Primary insomnia F51.01    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, N13.8     Patient Active Problem List    Diagnosis Date Noted    Essential (primary) hypertension 09/21/2020    Headache disorder 09/21/2020    Primary insomnia 09/21/2020    BPH with urinary obstruction 09/21/2020    Peptic ulcer disease with hemorrhage     GI (gastrointestinal bleed) 10/07/2011    Acute posthemorrhagic anemia 10/07/2011    Hypotension, unspecified 10/07/2011    BRETT (acute kidney injury) ([facility] [address] 10/07/2011    Unspecified hypothyroidism 10/07/2011    Chest pain, unspecified 10/07/2011     Current Outpatient Medications   Medication Sig Dispense Refill    butalbital-acetaminophen-caffeine (FIORICET, ESGIC) -40 mg per tablet TAKE 1 TABLET BY MOUTH  EVERY 6 HOURS AS NEEDED FOR HEADACHE(S) 60 Tab 2    lisinopriL (PRINIVIL, ZESTRIL) 10 mg tablet TAKE 1 TABLET BY MOUTH  DAILY 90 Tab 1    fenofibrate (LOFIBRA) 160 mg tablet TAKE 1 TABLET BY MOUTH  DAILY 90 Tab 1    levothyroxine (SYNTHROID) 50 mcg tablet TAKE 1 TABLET BY MOUTH  DAILY 90 Tab 1    fluticasone propionate (FLONASE) 50 mcg/actuation nasal spray       latanoprost (XALATAN) 0.005 % ophthalmic solution       pantoprazole (PROTONIX) 40 mg tablet       timolol (TIMOPTIC) 0.5 % ophthalmic solution       diphenoxylate-atropine (LomotiL) 2.5-0.025 mg per tablet Take 1 Tab by mouth four (4) times daily as needed for Diarrhea. Max Daily Amount: 4 Tabs. 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 90 Tab 1    tamsulosin (FLOMAX) 0.4 mg capsule Take 1 Cap by mouth daily. 90 Cap 1    zolpidem (AMBIEN) 10 mg tablet Take 1 Tab by mouth nightly as needed for Sleep. Max Daily Amount: 10 mg. 90 Tab 1     Allergies   Allergen Reactions    Aspirin Other (comments)     Ulcers (bleeding)    Nsaids (Non-Steroidal Anti-Inflammatory Drug) Other (comments)     Ulcers       Past Medical History:   Diagnosis Date    CAD (coronary artery disease)     hypercholesterolemia    Cancer ([facility] [address]     Prostate    Functional diarrhea     hx of partial gastrectomy    GERD (gastroesophageal reflux disease)     Hypercholesteremia     Hypertension     Hypothyroid     Migraines     Peptic ulcer disease with hemorrhage  6 years ago    Had gastric surgery in [place]     Past Surgical History:   Procedure Laterality Date    ABDOMEN SURGERY PROC UNLISTED      HX GASTRECTOMY  2006    Partial gastectmy     Family History   Problem Relation Age of Onset    Heart Attack Other      Social History     Tobacco Use    Smoking status: Former Smoker    Smokeless tobacco: Never Used   Substance Use Topics    Alcohol use: No           Review of Systems   Constitutional: Negative for fever and weight loss. HENT: Negative for sore throat and tinnitus. Eyes: Negative for blurred vision and double vision. Respiratory: Negative for shortness of breath. Cardiovascular: Negative for chest pain, palpitations and leg swelling. Gastrointestinal: Negative for constipation and diarrhea. Skin: Negative for itching and rash. Neurological: Negative for dizziness. Endo/Heme/Allergies: Does not bruise/bleed easily. Psychiatric/Behavioral: Negative for depression. The patient is not nervous/anxious and does not have insomnia. Physical Exam  Vitals signs reviewed. Constitutional:       Appearance: Normal appearance. He is normal weight. HENT:      Head: Normocephalic. Nose: Nose normal.      Mouth/Throat:      Mouth: Mucous membranes are moist.   Eyes:      Extraocular Movements: Extraocular movements intact. Pupils: Pupils are equal, round, and reactive to light. Neck:      Musculoskeletal: Normal range of motion and neck supple. Cardiovascular:      Rate and Rhythm: Normal rate and regular rhythm. Pulses: Normal pulses. Heart sounds: Murmur present. Pulmonary:      Effort: Pulmonary effort is normal.      Breath sounds: Normal breath sounds. Musculoskeletal: Normal range of motion. Skin:     General: Skin is warm and dry. Neurological:      General: No focal deficit present. Mental Status: He is alert and oriented to person, place, and time. Psychiatric:         Attention and Perception: Attention and perception normal.         Mood and Affect: Affect normal.         Speech: Speech normal.         Behavior: Behavior normal. Behavior is cooperative. Thought Content: Thought content normal.         Cognition and Memory: Cognition and memory normal.         Judgment: Judgment normal.           ASSESSMENT and PLAN    1. Dysuria  Refills/labs  - tamsulosin (FLOMAX) 0.4 mg capsule; Take 1 Cap by mouth daily. Dispense: 90 Cap;  Refill: 1  - PSA, DIAGNOSTIC (PROSTATE SPECIFIC AG)    2. Essential (primary) hypertension  Refills labs  - lisinopriL (PRINIVIL, ZESTRIL) 10 mg tablet; TAKE 1 TABLET BY MOUTH  DAILY  Dispense: 90 Tab; Refill: 1  - amLODIPine (NORVASC) 5 mg tablet; Take 1 Tab by mouth daily. Dispense: 90 Tab; Refill: 1  - CBC WITH AUTOMATED DIFF  - METABOLIC PANEL, COMPREHENSIVE    3. Headache disorder  Refills   - butalbital-acetaminophen-caffeine (FIORICET, ESGIC) -40 mg per tablet; TAKE 1 TABLET BY MOUTH  EVERY 6 HOURS AS NEEDED FOR HEADACHE(S)  Dispense: 60 Tab; Refill: 2    4. Acquired hypothyroidism  Refills and labs  - levothyroxine (SYNTHROID) 50 mcg tablet; TAKE 1 TABLET BY MOUTH  DAILY  Dispense: 90 Tab; Refill: 1  - TSH RFX ON ABNORMAL TO FREE T4    5. Primary insomnia  Refills. - zolpidem (AMBIEN) 10 mg tablet; Take 1 Tab by mouth nightly as needed for Sleep. Max Daily Amount: 10 mg. Dispense: 90 Tab; Refill: 1    6. Benign prostatic hyperplasia, unspecified whether lower urinary tract symptoms present  labs  - PSA, DIAGNOSTIC (PROSTATE SPECIFIC AG)    7. Mixed hyperlipidemia  refills  - fenofibrate (LOFIBRA) 160 mg tablet; TAKE 1 TABLET BY MOUTH  DAILY  Dispense: 90 Tab;  Refill: [facility],

## 2021-03-09 LAB
ALBUMIN SERPL-MCNC: 4.3 G/DL (ref 3.6–4.6)
ALBUMIN/GLOB SERPL: 1.8 {RATIO} (ref 1.2–2.2)
ALP SERPL-CCNC: 52 IU/L (ref 39–117)
ALT SERPL-CCNC: 9 IU/L (ref 0–44)
AST SERPL-CCNC: 18 IU/L (ref 0–40)
BASOPHILS # BLD AUTO: 0 X10E3/UL (ref 0–0.2)
BASOPHILS NFR BLD AUTO: 1 %
BILIRUB SERPL-MCNC: 0.2 MG/DL (ref 0–1.2)
BUN SERPL-MCNC: 16 MG/DL (ref 8–27)
BUN/CREAT SERPL: 15 (ref 10–24)
CALCIUM SERPL-MCNC: 10.6 MG/DL (ref 8.6–10.2)
CHLORIDE SERPL-SCNC: 107 MMOL/L (ref 96–106)
CO2 SERPL-SCNC: 20 MMOL/L (ref 20–29)
CREAT SERPL-MCNC: 1.09 MG/DL (ref 0.76–1.27)
EOSINOPHIL # BLD AUTO: 0.2 X10E3/UL (ref 0–0.4)
EOSINOPHIL NFR BLD AUTO: 3 %
ERYTHROCYTE [DISTWIDTH] IN BLOOD BY AUTOMATED COUNT: 12.3 % (ref 11.6–15.4)
GLOBULIN SER CALC-MCNC: 2.4 G/DL (ref 1.5–4.5)
GLUCOSE SERPL-MCNC: 92 MG/DL (ref 65–99)
HCT VFR BLD AUTO: 36.7 % (ref 37.5–51)
HGB BLD-MCNC: 12.6 G/DL (ref 13–17.7)
IMM GRANULOCYTES # BLD AUTO: 0 X10E3/UL (ref 0–0.1)
IMM GRANULOCYTES NFR BLD AUTO: 0 %
LYMPHOCYTES # BLD AUTO: 2 X10E3/UL (ref 0.7–3.1)
LYMPHOCYTES NFR BLD AUTO: 23 %
MCH RBC QN AUTO: 33.9 PG (ref 26.6–33)
MCHC RBC AUTO-ENTMCNC: 34.3 G/DL (ref 31.5–35.7)
MCV RBC AUTO: 99 FL (ref 79–97)
MONOCYTES # BLD AUTO: 1 X10E3/UL (ref 0.1–0.9)
MONOCYTES NFR BLD AUTO: 12 %
MORPHOLOGY BLD-IMP: ABNORMAL
NEUTROPHILS # BLD AUTO: 5.4 X10E3/UL (ref 1.4–7)
NEUTROPHILS NFR BLD AUTO: 61 %
PLATELET # BLD AUTO: ABNORMAL X10E3/UL
POTASSIUM SERPL-SCNC: 4.3 MMOL/L (ref 3.5–5.2)
PROT SERPL-MCNC: 6.7 G/DL (ref 6–8.5)
PSA SERPL-MCNC: 2.4 NG/ML (ref 0–4)
RBC # BLD AUTO: 3.72 X10E6/UL (ref 4.14–5.8)
SODIUM SERPL-SCNC: 143 MMOL/L (ref 134–144)
TSH SERPL DL<=0.005 MIU/L-ACNC: 4.47 UIU/ML (ref 0.45–4.5)
WBC # BLD AUTO: 8.7 X10E3/UL (ref 3.4–10.8)

## 2021-03-09 NOTE — PATIENT INSTRUCTIONS
High Blood Pressure: Care Instructions  Overview     It's normal for blood pressure to go up and down throughout the day. But if it stays up, you have high blood pressure. Another name for high blood pressure is hypertension. Despite what a lot of people think, high blood pressure usually doesn't cause headaches or make you feel dizzy or lightheaded. It usually has no symptoms. But it does increase your risk of stroke, heart attack, and other problems. You and your doctor will talk about your risks of these problems based on your blood pressure. Your doctor will give you a goal for your blood pressure. Your goal will be based on your health and your age. Lifestyle changes, such as eating healthy and being active, are always important to help lower blood pressure. You might also take medicine to reach your blood pressure goal.  Follow-up care is a key part of your treatment and safety. Be sure to make and go to all appointments, and call your doctor if you are having problems. It's also a good idea to know your test results and keep a list of the medicines you take. How can you care for yourself at home? Medical treatment  · If you stop taking your medicine, your blood pressure will go back up. You may take one or more types of medicine to lower your blood pressure. Be safe with medicines. Take your medicine exactly as prescribed. Call your doctor if you think you are having a problem with your medicine. · Talk to your doctor before you start taking aspirin every day. Aspirin can help certain people lower their risk of a heart attack or stroke. But taking aspirin isn't right for everyone, because it can cause serious bleeding. · See your doctor regularly. You may need to see the doctor more often at first or until your blood pressure comes down. · If you are taking blood pressure medicine, talk to your doctor before you take decongestants or anti-inflammatory medicine, such as ibuprofen.  Some of these medicines can raise blood pressure. · Learn how to check your blood pressure at home. Lifestyle changes  · Stay at a healthy weight. This is especially important if you put on weight around the waist. Losing even 10 pounds can help you lower your blood pressure. · If your doctor recommends it, get more exercise. Walking is a good choice. Bit by bit, increase the amount you walk every day. Try for at least 30 minutes on most days of the week. You also may want to swim, bike, or do other activities. · Avoid or limit alcohol. Talk to your doctor about whether you can drink any alcohol. · Try to limit how much sodium you eat to less than 2,300 milligrams (mg) a day. Your doctor may ask you to try to eat less than 1,500 mg a day. · Eat plenty of fruits (such as bananas and oranges), vegetables, legumes, whole grains, and low-fat dairy products. · Lower the amount of saturated fat in your diet. Saturated fat is found in animal products such as milk, cheese, and meat. Limiting these foods may help you lose weight and also lower your risk for heart disease. · Do not smoke. Smoking increases your risk for heart attack and stroke. If you need help quitting, talk to your doctor about stop-smoking programs and medicines. These can increase your chances of quitting for good. When should you call for help? Call  911 anytime you think you may need emergency care. This may mean having symptoms that suggest that your blood pressure is causing a serious heart or blood vessel problem. Your blood pressure may be over 180/120. For example, call 911 if:    · You have symptoms of a heart attack. These may include:  ? Chest pain or pressure, or a strange feeling in the chest.  ? Sweating. ? Shortness of breath. ? Nausea or vomiting. ? Pain, pressure, or a strange feeling in the back, neck, jaw, or upper belly or in one or both shoulders or arms. ? Lightheadedness or sudden weakness.   ? A fast or irregular heartbeat.     · You have symptoms of a stroke. These may include:  ? Sudden numbness, tingling, weakness, or loss of movement in your face, arm, or leg, especially on only one side of your body. ? Sudden vision changes. ? Sudden trouble speaking. ? Sudden confusion or trouble understanding simple statements. ? Sudden problems with walking or balance. ? A sudden, severe headache that is different from past headaches.     · You have severe back or belly pain. Do not wait until your blood pressure comes down on its own. Get help right away. Call your doctor now or seek immediate care if:    · Your blood pressure is much higher than normal (such as 180/120 or higher), but you don't have symptoms.     · You think high blood pressure is causing symptoms, such as:  ? Severe headache.  ? Blurry vision. Watch closely for changes in your health, and be sure to contact your doctor if:    · Your blood pressure measures higher than your doctor recommends at least 2 times. That means the top number is higher or the bottom number is higher, or both.     · You think you may be having side effects from your blood pressure medicine. Where can you learn more? Go to http://www.gray.com/  Enter [id number] in the search box to learn more about \"High Blood Pressure: Care Instructions. \"  Current as of: December 16, 2019               Content Version: 12.6  © 9287-9208 [employer] by [employer]. Care instructions adapted under license by [employer] (which disclaims liability or warranty for this information). If you have questions about a medical condition or this instruction, always ask your healthcare professional. [place] [address] any warranty or liability for your use of this information. Learning About Physical Activity  What is physical activity? Physical activity is any kind of activity that gets your body moving.   The types of physical activity that can help you get fit and stay healthy include:  · Aerobic or \"cardio\" activities that make your heart beat faster and make you breathe harder, such as brisk walking, riding a bike, or running. Aerobic activities strengthen your heart and lungs and build up your endurance. · Strength training activities that make your muscles work against, or \"resist,\" something, such as lifting weights or doing push-ups. These activities help tone and strengthen your muscles. · Stretches that allow you to move your joints and muscles through their full range of motion. Stretching helps you be more flexible and avoid injury. What are the benefits of physical activity? Being active is one of the best things you can do for your health. It helps you to:  · Feel stronger and have more energy to do all the things you like to do. · Focus better at school or work. · Feel, think, and sleep better. · Reach and stay at a healthy weight. · Lose fat and build lean muscle. · Lower your risk for serious health problems. · Keep your bones, muscles, and joints strong. How can you make physical activity part of your life? Get at least 30 minutes of exercise on most days of the week. Walking is a good choice. You also may want to do other activities, such as running, swimming, cycling, or playing tennis or team sports. Pick activities that you like--ones that make your heart beat faster, your muscles stronger, and your muscles and joints more flexible. If you find more than one thing you like doing, do them all. You don't have to do the same thing every day. Get your heart pumping every day. Any activity that makes your heart beat faster and keeps it at that rate for a while counts. Here are some great ways to get your heart beating faster:  · Go for a brisk walk, run, or bike ride. · Go for a hike or swim. · Go in-line skating. · Play a game of touch football, basketball, or soccer. · Ride a bike. · Play tennis or racquetball. · Climb stairs.   Even some household chores can be aerobic--just do them at a faster pace. Vacuuming, raking or mowing the lawn, sweeping the garage, and washing and waxing the car all can help get your heart rate up. Strengthen your muscles during the week. You don't have to lift heavy weights or grow big, bulky muscles to get stronger. Doing a few simple activities that make your muscles work against, or \"resist,\" something can help you get stronger. For example, you can:  · Do push-ups or sit-ups, which use your own body weight as resistance. · Lift weights or dumbbells or use stretch bands at home or in a gym or community center. Stretch your muscles often. Stretching will help you as you become more active. It can help you stay flexible, loosen tight muscles, and avoid injury. It can also help improve your balance and posture and can be a great way to relax. Be sure to stretch the muscles you'll be using when you work out. It's best to warm your muscles slightly before you stretch them. Walk or do some other light aerobic activity for a few minutes, and then start stretching. When you stretch your muscles:  · Do it slowly. Stretching is not about going fast or making sudden movements. · Don't push or bounce during a stretch. · Hold each stretch for at least 15 to 30 seconds, if you can. You should feel a stretch in the muscle, but not pain. · Breathe out as you do the stretch. Then breathe in as you hold the stretch. Don't hold your breath. If you're worried about how more activity might affect your health, have a checkup before you start. Follow any special advice your doctor gives you for getting a smart start. Where can you learn more? Go to http://www.gray.com/  Enter [id number] in the search box to learn more about \"Learning About Physical Activity. \"  Current as of: January 16, 2020               Content Version: 12.6  © 2682-2061 [employer].    Care instructions adapted under license by [address] (which disclaims liability or warranty for this information). If you have questions about a medical condition or this instruction, always ask your healthcare professional. [address] any warranty or liability for your use of this information.

## 2021-04-15 RX ORDER — FLUTICASONE PROPIONATE 50 MCG
SPRAY, SUSPENSION (ML) NASAL
Qty: 32 G | Refills: 5 | Status: SHIPPED | OUTPATIENT
Start: 2021-04-15

## 2021-05-17 ENCOUNTER — OFFICE VISIT (OUTPATIENT)
Dept: PRIMARY CARE CLINIC | Age: 86
End: 2021-05-17
Payer: MEDICARE

## 2021-05-17 ENCOUNTER — TELEPHONE (OUTPATIENT)
Dept: PRIMARY CARE CLINIC | Age: 86
End: 2021-05-17

## 2021-05-17 VITALS
RESPIRATION RATE: 16 BRPM | HEART RATE: 65 BPM | SYSTOLIC BLOOD PRESSURE: 107 MMHG | HEIGHT: 68 IN | TEMPERATURE: 97.6 F | DIASTOLIC BLOOD PRESSURE: 57 MMHG | OXYGEN SATURATION: 99 % | BODY MASS INDEX: 21.98 KG/M2 | WEIGHT: 145 LBS

## 2021-05-17 DIAGNOSIS — J01.10 ACUTE FRONTAL SINUSITIS, RECURRENCE NOT SPECIFIED: Primary | ICD-10-CM

## 2021-05-17 DIAGNOSIS — Z00.00 ANNUAL PHYSICAL EXAM: ICD-10-CM

## 2021-05-17 PROCEDURE — G8420 CALC BMI NORM PARAMETERS: HCPCS | Performed by: NURSE PRACTITIONER

## 2021-05-17 PROCEDURE — 99214 OFFICE O/P EST MOD 30 MIN: CPT | Performed by: NURSE PRACTITIONER

## 2021-05-17 PROCEDURE — 1101F PT FALLS ASSESS-DOCD LE1/YR: CPT | Performed by: NURSE PRACTITIONER

## 2021-05-17 PROCEDURE — G8427 DOCREV CUR MEDS BY ELIG CLIN: HCPCS | Performed by: NURSE PRACTITIONER

## 2021-05-17 PROCEDURE — G0439 PPPS, SUBSEQ VISIT: HCPCS | Performed by: NURSE PRACTITIONER

## 2021-05-17 PROCEDURE — G8536 NO DOC ELDER MAL SCRN: HCPCS | Performed by: NURSE PRACTITIONER

## 2021-05-17 PROCEDURE — G8432 DEP SCR NOT DOC, RNG: HCPCS | Performed by: NURSE PRACTITIONER

## 2021-05-17 RX ORDER — AMOXICILLIN AND CLAVULANATE POTASSIUM 875; 125 MG/1; MG/1
1 TABLET, FILM COATED ORAL 2 TIMES DAILY
Qty: 20 TAB | Refills: 0 | Status: SHIPPED | OUTPATIENT
Start: 2021-05-17 | End: 2021-05-27

## 2021-05-17 RX ORDER — AMOXICILLIN AND CLAVULANATE POTASSIUM 875; 125 MG/1; MG/1
1 TABLET, FILM COATED ORAL 2 TIMES DAILY
Qty: 20 TAB | Refills: 0 | Status: SHIPPED | OUTPATIENT
Start: 2021-05-17 | End: 2021-05-17

## 2021-05-17 RX ORDER — MINERAL OIL
180 ENEMA (ML) RECTAL
Qty: 30 TAB | Refills: 2 | Status: SHIPPED | OUTPATIENT
Start: 2021-05-17 | End: 2021-05-17

## 2021-05-17 RX ORDER — MINERAL OIL
180 ENEMA (ML) RECTAL
Qty: 30 TAB | Refills: 2 | Status: SHIPPED | OUTPATIENT
Start: 2021-05-17

## 2021-05-17 NOTE — PROGRESS NOTES
1. Have you been to the ER, urgent care clinic since your last visit? Hospitalized since your last visit? No    2. Have you seen or consulted any other health care providers outside of the [address] [place] [address] since your last visit? Include any pap smears or colon screening.  No

## 2021-05-17 NOTE — PROGRESS NOTES
HISTORY OF PRESENT ILLNESS  Sulema Levi is a 80 y.o. male presents for   Chief Complaint   Patient presents with    Cold Symptoms    2 weeks plus of sinus symptoms after mowing the grass states he has allergies uses Flonase regularly and states it has been helping although now it seems to worsen his having fullness sensation under his eyes around his nose and above his eyes denies fever denies shortness of breath states having trouble hearing although he usually wears hearing aids and has lost them but states it is worse now with this sinus pressure denies fever denies any significant cough sometimes feels tickle and does cough has used Mucinex and NyQuil as well as it has tried steam    States he is really never had any sense of smell but has lost weight due to the sensation in his sinuses  Has had the COVID vaccination       Vitals:    05/17/21 0856   BP: (!) 107/57   BP 1 Location: Right arm   BP Patient Position: Sitting   Pulse: 65   Resp: 16   Temp: 97.6 °F (36.4 °C)   TempSrc: Oral   SpO2: 99%   Weight: 145 lb (65.8 kg)   Height: 5' 8\" (1.727 m)      Patient Active Problem List   Diagnosis Code    GI (gastrointestinal bleed) K92.2    Acute posthemorrhagic anemia D62    Hypotension, unspecified I95.9    BRETT (acute kidney injury) ([facility] [address] N17.9    Acquired hypothyroidism E03.9    Chest pain, unspecified R07.9    Peptic ulcer disease with hemorrhage K27.4    Essential (primary) hypertension I10    Headache disorder R51.9    Primary insomnia F51.01    Benign prostatic hyperplasia N40.0    Mixed hyperlipidemia E78.2     Patient Active Problem List    Diagnosis Date Noted    Mixed hyperlipidemia 03/08/2021    Essential (primary) hypertension 09/21/2020    Headache disorder 09/21/2020    Primary insomnia 09/21/2020    Benign prostatic hyperplasia 09/21/2020    Peptic ulcer disease with hemorrhage     GI (gastrointestinal bleed) 10/07/2011    Acute posthemorrhagic anemia 10/07/2011    Hypotension, unspecified 10/07/2011    BRETT (acute kidney injury) ([facility] [address] 10/07/2011    Acquired hypothyroidism 10/07/2011    Chest pain, unspecified 10/07/2011     Current Outpatient Medications   Medication Sig Dispense Refill    fluticasone propionate (FLONASE) 50 mcg/actuation nasal spray SPRAY 1 SPRAY INTO EACH  NOSTRIL EVERY DAY 32 g 5    tamsulosin (FLOMAX) 0.4 mg capsule Take 1 Cap by mouth daily. 90 Cap 1    lisinopriL (PRINIVIL, ZESTRIL) 10 mg tablet TAKE 1 TABLET BY MOUTH  DAILY 90 Tab 1    amLODIPine (NORVASC) 5 mg tablet Take 1 Tab by mouth daily. 90 Tab 1    butalbital-acetaminophen-caffeine (FIORICET, ESGIC) -40 mg per tablet TAKE 1 TABLET BY MOUTH  EVERY 6 HOURS AS NEEDED FOR HEADACHE(S) 60 Tab 2    levothyroxine (SYNTHROID) 50 mcg tablet TAKE 1 TABLET BY MOUTH  DAILY 90 Tab 1    zolpidem (AMBIEN) 10 mg tablet Take 1 Tab by mouth nightly as needed for Sleep. Max Daily Amount: 10 mg. 90 Tab 1    fenofibrate (LOFIBRA) 160 mg tablet TAKE 1 TABLET BY MOUTH  DAILY 90 Tab 1    latanoprost (XALATAN) 0.005 % ophthalmic solution       pantoprazole (PROTONIX) 40 mg tablet       timolol (TIMOPTIC) 0.5 % ophthalmic solution       diphenoxylate-atropine (LomotiL) 2.5-0.025 mg per tablet Take 1 Tab by mouth four (4) times daily as needed for Diarrhea. Max Daily Amount: 4 Tabs.  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 60 Tab 1     Allergies   Allergen Reactions    Aspirin Other (comments)     Ulcers (bleeding)    Nsaids (Non-Steroidal Anti-Inflammatory Drug) Other (comments)     Ulcers       Past Medical History:   Diagnosis Date    CAD (coronary artery disease)     hypercholesterolemia    Cancer ([facility] [address]     Prostate    Functional diarrhea     hx of partial gastrectomy    GERD (gastroesophageal reflux disease)     Hypercholesteremia     Hypertension     Hypothyroid     Migraines     Peptic ulcer disease with hemorrhage  6 years ago    Had gastric surgery in [place]     Past Surgical History:   Procedure Laterality Date    HX GASTRECTOMY  2006    Partial gastectmy    [place] ABDOMEN SURGERY PROC UNLISTED       Family History   Problem Relation Age of Onset    Heart Attack Other      Social History     Tobacco Use    Smoking status: Former Smoker    Smokeless tobacco: Never Used   Substance Use Topics    Alcohol use: No           Review of Systems   Constitutional: Positive for weight loss. Negative for fever. HENT: Positive for congestion, hearing loss and sinus pain. Negative for sore throat and tinnitus. Eyes: Negative for blurred vision and double vision. Respiratory: Negative for shortness of breath. Cardiovascular: Negative for chest pain, palpitations and leg swelling. Gastrointestinal: Negative for constipation and diarrhea. Skin: Negative for itching and rash. Neurological: Negative for dizziness. Endo/Heme/Allergies: Does not bruise/bleed easily. Psychiatric/Behavioral: Negative for depression. The patient is not nervous/anxious and does not have insomnia. Physical Exam  Vitals signs reviewed. Constitutional:       Appearance: Normal appearance. He is normal weight. HENT:      Head: Normocephalic. Right Ear: Tympanic membrane normal.      Left Ear: Tympanic membrane normal.      Nose: Nose normal.      Mouth/Throat:      Mouth: Mucous membranes are moist.   Eyes:      Extraocular Movements: Extraocular movements intact. Pupils: Pupils are equal, round, and reactive to light. Neck:      Musculoskeletal: Normal range of motion and neck supple. Cardiovascular:      Rate and Rhythm: Normal rate and regular rhythm. Pulses: Normal pulses. Heart sounds: Normal heart sounds. Pulmonary:      Effort: Pulmonary effort is normal. No respiratory distress. Breath sounds: Normal breath sounds. Chest:      Comments: Barrel chest appearance  Musculoskeletal: Normal range of motion.    Skin:     General: Skin is warm and dry. Neurological:      General: No focal deficit present. Mental Status: He is alert and oriented to person, place, and time. Psychiatric:         Attention and Perception: Attention and perception normal.         Mood and Affect: Affect normal.         Speech: Speech normal.         Behavior: Behavior normal. Behavior is cooperative. Thought Content: Thought content normal.         Cognition and Memory: Cognition and memory normal.         Judgment: Judgment normal.           ASSESSMENT and PLAN  Diagnoses and all orders for this visit:    1. Acute frontal sinusitis, recurrence not specified  Comments:  2+ weeks of symptoms start Augmentin already on Flonase with mild relief explained to come back if not improving also placed on Allegra  Orders:  -     amoxicillin-clavulanate (AUGMENTIN) 875-125 mg per tablet; Take 1 Tab by mouth two (2) times a day for 10 days. -     fexofenadine (ALLEGRA) 180 mg tablet; Take 1 Tab by mouth nightly. I have little concerned due to the 8 pound weight loss was very expressive in my desire that if he is not feeling better or things worsen he needs to return here or go to the emergency room patient expressed acknowledgment; will trial antibiotics patient states he is never been on steroids before although hard to tell if he understood that particular part of the conversation due to his hearing issues today would entertain a small dose few day steroids if not better and switch antibiotics if needed  2. Annual physical exam  Comments:  Medicare wellness       There are no diagnoses linked to this encounter.    Elle Perez NP

## 2021-05-17 NOTE — PROGRESS NOTES
This is the Subsequent Medicare Annual Wellness Exam, performed 12 months or more after the Initial AWV or the last Subsequent AWV    I have reviewed the patient's medical history in detail and updated the computerized patient record. Assessment/Plan   Education and counseling provided:  Are appropriate based on today's review and evaluation  Colorectal cancer screening tests  Screening for glaucoma  COVID vaccination already done    1. Acute frontal sinusitis, recurrence not specified       Depression Risk Factor Screening     3 most recent PHQ Screens 5/17/2021   Little interest or pleasure in doing things Not at all   Feeling down, depressed, irritable, or hopeless Not at all   Total Score PHQ 2 0       Alcohol Risk Screen    Do you average more than 1 drink per night or more than 7 drinks a week: No    In the past three months have you have had more than 4 drinks containing alcohol on one occasion: No        Functional Ability and Level of Safety    Hearing: Hearing is good. hearing aids (he lost)      Activities of Daily Living: The home contains: no safety equipment. Patient does total self care      Ambulation: with no difficulty     Fall Risk:  Fall Risk Assessment, last 12 mths 5/17/2021   Able to walk? Yes   Fall in past 12 months? 0   Do you feel unsteady? 0   Are you worried about falling 0   Number of falls in past 12 months -   Fall with injury?  -      Abuse Screen:  Patient is not abused       Cognitive Screening    Has your family/caregiver stated any concerns about your memory: no     Cognitive Screening: interview    Health Maintenance Due     Health Maintenance Due   Topic Date Due    COVID-19 Vaccine (1) Never done    DTaP/Tdap/Td series (1 - Tdap) Never done    Shingrix Vaccine Age 50> (1 of 2) Never done    Pneumococcal 65+ years (1 of 1 - PPSV23) Never done    Medicare Yearly Exam  Never done       Patient Care Team   Patient Care Team:  Fabi Choudhary NP as PCP - General (Nurse Practitioner)  Femi Mejia NP as PCP - [address] Provider  STEVIE Decker MD (Endocrinology)    History     Patient Active Problem List   Diagnosis Code    GI (gastrointestinal bleed) K92.2    Acute posthemorrhagic anemia D62    Hypotension, unspecified I95.9    BRETT (acute kidney injury) ([facility] [address] N17.9    Acquired hypothyroidism E03.9    Chest pain, unspecified R07.9    Peptic ulcer disease with hemorrhage K27.4    Essential (primary) hypertension I10    Headache disorder R51.9    Primary insomnia F51.01    Benign prostatic hyperplasia N40.0    Mixed hyperlipidemia E78.2     Past Medical History:   Diagnosis Date    CAD (coronary artery disease)     hypercholesterolemia    Cancer ([facility] [address]     Prostate    Functional diarrhea     hx of partial gastrectomy    GERD (gastroesophageal reflux disease)     Hypercholesteremia     Hypertension     Hypothyroid     Migraines     Peptic ulcer disease with hemorrhage  6 years ago    Had gastric surgery in [place]      Past Surgical History:   Procedure Laterality Date    HX GASTRECTOMY  2006    Partial gastectmy    [place] ABDOMEN SURGERY PROC UNLISTED       Current Outpatient Medications   Medication Sig Dispense Refill    fluticasone propionate (FLONASE) 50 mcg/actuation nasal spray SPRAY 1 SPRAY INTO EACH  NOSTRIL EVERY DAY 32 g 5    tamsulosin (FLOMAX) 0.4 mg capsule Take 1 Cap by mouth daily. 90 Cap 1    lisinopriL (PRINIVIL, ZESTRIL) 10 mg tablet TAKE 1 TABLET BY MOUTH  DAILY 90 Tab 1    amLODIPine (NORVASC) 5 mg tablet Take 1 Tab by mouth daily. 90 Tab 1    butalbital-acetaminophen-caffeine (FIORICET, ESGIC) -40 mg per tablet TAKE 1 TABLET BY MOUTH  EVERY 6 HOURS AS NEEDED FOR HEADACHE(S) 60 Tab 2    levothyroxine (SYNTHROID) 50 mcg tablet TAKE 1 TABLET BY MOUTH  DAILY 90 Tab 1    zolpidem (AMBIEN) 10 mg tablet Take 1 Tab by mouth nightly as needed for Sleep.  Max Daily Amount: 10 mg. 90 Tab 1    fenofibrate (LOFIBRA) 160 mg tablet TAKE 1 TABLET BY MOUTH  DAILY 90 Tab 1    latanoprost (XALATAN) 0.005 % ophthalmic solution       pantoprazole (PROTONIX) 40 mg tablet       timolol (TIMOPTIC) 0.5 % ophthalmic solution       diphenoxylate-atropine (LomotiL) 2.5-0.025 mg per tablet Take 1 Tab by mouth four (4) times daily as needed for Diarrhea. Max Daily Amount: 4 Tabs.  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

## 2021-05-26 DIAGNOSIS — J01.10 ACUTE FRONTAL SINUSITIS, RECURRENCE NOT SPECIFIED: ICD-10-CM

## 2021-05-26 DIAGNOSIS — R51.9 HEADACHE DISORDER: Chronic | ICD-10-CM

## 2021-05-26 DIAGNOSIS — I10 ESSENTIAL (PRIMARY) HYPERTENSION: Chronic | ICD-10-CM

## 2021-05-26 DIAGNOSIS — R30.0 DYSURIA: ICD-10-CM

## 2021-05-26 RX ORDER — TAMSULOSIN HYDROCHLORIDE 0.4 MG/1
CAPSULE ORAL
Qty: 90 CAPSULE | Refills: 0 | Status: SHIPPED | OUTPATIENT
Start: 2021-05-26 | End: 2021-08-30 | Stop reason: SDUPTHER

## 2021-05-26 RX ORDER — PANTOPRAZOLE SODIUM 40 MG/1
TABLET, DELAYED RELEASE ORAL
Qty: 90 TABLET | Refills: 0 | Status: SHIPPED | OUTPATIENT
Start: 2021-05-26 | End: 2021-06-21

## 2021-05-26 RX ORDER — AMOXICILLIN AND CLAVULANATE POTASSIUM 875; 125 MG/1; MG/1
TABLET, FILM COATED ORAL
Qty: 20 TABLET | Refills: 0 | OUTPATIENT
Start: 2021-05-26

## 2021-05-26 RX ORDER — BUTALBITAL, ACETAMINOPHEN AND CAFFEINE 50; 325; 40 MG/1; MG/1; MG/1
TABLET ORAL
Qty: 60 TABLET | Refills: 0 | Status: SHIPPED | OUTPATIENT
Start: 2021-05-26 | End: 2021-05-26 | Stop reason: SDUPTHER

## 2021-05-26 RX ORDER — BUTALBITAL, ACETAMINOPHEN AND CAFFEINE 50; 325; 40 MG/1; MG/1; MG/1
TABLET ORAL
Qty: 60 TABLET | Refills: 0 | Status: SHIPPED | OUTPATIENT
Start: 2021-05-26 | End: 2021-07-26

## 2021-05-26 RX ORDER — AMLODIPINE BESYLATE 5 MG/1
TABLET ORAL
Qty: 90 TABLET | Refills: 0 | Status: SHIPPED | OUTPATIENT
Start: 2021-05-26 | End: 2021-09-13

## 2021-05-26 NOTE — TELEPHONE ENCOUNTER
Patient called and stated that [facility] informed them that a new prescription needed to be sent over for this medication from [facility].

## 2021-06-21 ENCOUNTER — OFFICE VISIT (OUTPATIENT)
Dept: PRIMARY CARE CLINIC | Age: 86
End: 2021-06-21
Payer: MEDICARE

## 2021-06-21 VITALS
DIASTOLIC BLOOD PRESSURE: 61 MMHG | WEIGHT: 152 LBS | BODY MASS INDEX: 23.04 KG/M2 | HEART RATE: 56 BPM | HEIGHT: 68 IN | TEMPERATURE: 98.2 F | SYSTOLIC BLOOD PRESSURE: 128 MMHG | OXYGEN SATURATION: 97 % | RESPIRATION RATE: 16 BRPM

## 2021-06-21 DIAGNOSIS — K59.1 FUNCTIONAL DIARRHEA: ICD-10-CM

## 2021-06-21 DIAGNOSIS — M25.561 ACUTE PAIN OF BOTH KNEES: Primary | ICD-10-CM

## 2021-06-21 DIAGNOSIS — M25.562 ACUTE PAIN OF BOTH KNEES: Primary | ICD-10-CM

## 2021-06-21 PROCEDURE — G8420 CALC BMI NORM PARAMETERS: HCPCS | Performed by: NURSE PRACTITIONER

## 2021-06-21 PROCEDURE — G8432 DEP SCR NOT DOC, RNG: HCPCS | Performed by: NURSE PRACTITIONER

## 2021-06-21 PROCEDURE — 99214 OFFICE O/P EST MOD 30 MIN: CPT | Performed by: NURSE PRACTITIONER

## 2021-06-21 PROCEDURE — G8536 NO DOC ELDER MAL SCRN: HCPCS | Performed by: NURSE PRACTITIONER

## 2021-06-21 PROCEDURE — G8427 DOCREV CUR MEDS BY ELIG CLIN: HCPCS | Performed by: NURSE PRACTITIONER

## 2021-06-21 PROCEDURE — 1101F PT FALLS ASSESS-DOCD LE1/YR: CPT | Performed by: NURSE PRACTITIONER

## 2021-06-21 RX ORDER — FAMOTIDINE 40 MG/1
40 TABLET, FILM COATED ORAL 2 TIMES DAILY
Qty: 30 TABLET | Refills: 0 | Status: SHIPPED | OUTPATIENT
Start: 2021-06-21 | End: 2021-06-21

## 2021-06-21 RX ORDER — KETOROLAC TROMETHAMINE 10 MG/1
10 TABLET, FILM COATED ORAL
Qty: 20 TABLET | Refills: 0 | Status: SHIPPED | OUTPATIENT
Start: 2021-06-21 | End: 2021-06-21

## 2021-06-21 RX ORDER — PREGABALIN 200 MG/1
200 CAPSULE ORAL
Qty: 40 CAPSULE | Refills: 0 | Status: SHIPPED | OUTPATIENT
Start: 2021-06-21

## 2021-06-21 RX ORDER — PREGABALIN 200 MG/1
200 CAPSULE ORAL
Qty: 40 CAPSULE | Refills: 0 | Status: SHIPPED | OUTPATIENT
Start: 2021-06-21 | End: 2021-06-21

## 2021-06-21 RX ORDER — DIPHENOXYLATE HYDROCHLORIDE AND ATROPINE SULFATE 2.5; .025 MG/1; MG/1
1 TABLET ORAL
Qty: 60 TABLET | Refills: 1 | Status: SHIPPED | OUTPATIENT
Start: 2021-06-21 | End: 2021-06-21

## 2021-06-21 RX ORDER — DIPHENOXYLATE HYDROCHLORIDE AND ATROPINE SULFATE 2.5; .025 MG/1; MG/1
1 TABLET ORAL
Qty: 60 TABLET | Refills: 1 | Status: SHIPPED | OUTPATIENT
Start: 2021-06-21

## 2021-06-21 RX ORDER — KETOROLAC TROMETHAMINE 10 MG/1
10 TABLET, FILM COATED ORAL
Qty: 20 TABLET | Refills: 0 | Status: SHIPPED | OUTPATIENT
Start: 2021-06-21

## 2021-06-21 RX ORDER — FAMOTIDINE 40 MG/1
40 TABLET, FILM COATED ORAL 2 TIMES DAILY
Qty: 30 TABLET | Refills: 0 | Status: SHIPPED | OUTPATIENT
Start: 2021-06-21

## 2021-06-21 NOTE — PROGRESS NOTES
HISTORY OF PRESENT ILLNESS  Lizzie Sims is a 80 y.o. male presents for   Chief Complaint   Patient presents with    Knee Pain     Stiff and sore both knee's    fell last week   Complains of bilateral knee pain      Complains of intermittent Diarrhea. That \"the pills you gave me last time worked really good\"        Vitals:    06/21/21 1446   BP: 128/61   BP 1 Location: Right arm   BP Patient Position: Sitting   Pulse: (!) 56   Temp: 98.2 °F (36.8 °C)   TempSrc: Oral   Resp: 16   Height: 5' 8\" (1.727 m)   Weight: 152 lb (68.9 kg)   SpO2: 97%      Patient Active Problem List   Diagnosis Code    GI (gastrointestinal bleed) K92.2    Acute posthemorrhagic anemia D62    Hypotension, unspecified I95.9    BRETT (acute kidney injury) ([facility] [address] N17.9    Acquired hypothyroidism E03.9    Chest pain, unspecified R07.9    Peptic ulcer disease with hemorrhage K27.4    Essential (primary) hypertension I10    Headache disorder R51.9    Primary insomnia F51.01    Benign prostatic hyperplasia N40.0    Mixed hyperlipidemia E78.2     Patient Active Problem List    Diagnosis Date Noted    Mixed hyperlipidemia 03/08/2021    Essential (primary) hypertension 09/21/2020    Headache disorder 09/21/2020    Primary insomnia 09/21/2020    Benign prostatic hyperplasia 09/21/2020    Peptic ulcer disease with hemorrhage     GI (gastrointestinal bleed) 10/07/2011    Acute posthemorrhagic anemia 10/07/2011    Hypotension, unspecified 10/07/2011    BRETT (acute kidney injury) ([facility] [address] 10/07/2011    Acquired hypothyroidism 10/07/2011    Chest pain, unspecified 10/07/2011     Current Outpatient Medications   Medication Sig Dispense Refill    diphenoxylate-atropine (LomotiL) 2.5-0.025 mg per tablet Take 1 Tablet by mouth four (4) times daily as needed for Diarrhea. Max Daily Amount: 4 Tablets.  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 Max Daily Amount: 400 mg. 40 Capsule 0    ketorolac (TORADOL) 10 mg tablet Take 1 Tablet by mouth every six (6) hours as needed for Pain. 20 Tablet 0    famotidine (PEPCID) 40 mg tablet Take 1 Tablet by mouth two (2) times a day. 30 Tablet 0    amLODIPine (NORVASC) 5 mg tablet TAKE 1 TABLET BY MOUTH  DAILY 90 Tablet 0    butalbital-acetaminophen-caffeine (FIORICET, ESGIC) -40 mg per tablet Tab p.o. every 6 hours as needed 60 Tablet 0    fexofenadine (ALLEGRA) 180 mg tablet Take 1 Tab by mouth nightly. 30 Tab 2    fluticasone propionate (FLONASE) 50 mcg/actuation nasal spray SPRAY 1 SPRAY INTO EACH  NOSTRIL EVERY DAY 32 g 5    lisinopriL (PRINIVIL, ZESTRIL) 10 mg tablet TAKE 1 TABLET BY MOUTH  DAILY 90 Tab 1    levothyroxine (SYNTHROID) 50 mcg tablet TAKE 1 TABLET BY MOUTH  DAILY 90 Tab 1    zolpidem (AMBIEN) 10 mg tablet Take 1 Tab by mouth nightly as needed for Sleep.  Max Daily Amount: 10 mg. 90 Tab 1    fenofibrate (LOFIBRA) 160 mg tablet TAKE 1 TABLET BY MOUTH  DAILY 90 Tab 1    latanoprost (XALATAN) 0.005 % ophthalmic solution       timolol (TIMOPTIC) 0.5 % ophthalmic solution       tamsulosin (FLOMAX) 0.4 mg capsule TAKE 1 CAPSULE BY MOUTH  DAILY (Patient not taking: Reported on 6/21/2021) 90 Capsule 0     Allergies   Allergen Reactions    Aspirin Other (comments)     Ulcers (bleeding)    Nsaids (Non-Steroidal Anti-Inflammatory Drug) Other (comments)     Ulcers       Past Medical History:   Diagnosis Date    CAD (coronary artery disease)     hypercholesterolemia    Cancer ([facility] [address]     Prostate    Functional diarrhea     hx of partial gastrectomy    GERD (gastroesophageal reflux disease)     Hypercholesteremia     Hypertension     Hypothyroid     Migraines     Peptic ulcer disease with hemorrhage  6 years ago    Had gastric surgery in [place]     Past Surgical History:   Procedure Laterality Date    HX GASTRECTOMY  2006    Partial gastectmy    [place] ABDOMEN SURGERY PROC UNLISTED       Family History   Problem Relation Age of Onset    Heart Attack Other      Social History     Tobacco Use    Smoking status: Former Smoker    Smokeless tobacco: Never Used   Substance Use Topics    Alcohol use: No           Review of Systems   Constitutional: Negative for fever and weight loss. HENT: Negative for sore throat and tinnitus. Eyes: Negative for blurred vision and double vision. Respiratory: Negative for shortness of breath. Cardiovascular: Negative for chest pain, palpitations and leg swelling. Gastrointestinal: Positive for diarrhea. Negative for constipation. Musculoskeletal: Positive for joint pain. Skin: Negative for itching and rash. Neurological: Negative for dizziness. Endo/Heme/Allergies: Does not bruise/bleed easily. Psychiatric/Behavioral: Negative for depression. The patient is not nervous/anxious and does not have insomnia. Physical Exam  Vitals reviewed. Constitutional:       Appearance: Normal appearance. He is normal weight. HENT:      Head: Normocephalic. Nose: Nose normal.      Mouth/Throat:      Mouth: Mucous membranes are moist.   Eyes:      Extraocular Movements: Extraocular movements intact. Pupils: Pupils are equal, round, and reactive to light. Pulmonary:      Effort: Pulmonary effort is normal.   Musculoskeletal:         General: Normal range of motion. Cervical back: Normal range of motion and neck supple. Legs:       Comments: Bilateral pain in knees after a fall    Skin:     General: Skin is warm and dry. Neurological:      General: No focal deficit present. Mental Status: He is alert and oriented to person, place, and time. Psychiatric:         Attention and Perception: Attention and perception normal.         Mood and Affect: Affect normal.         Speech: Speech normal.         Behavior: Behavior normal. Behavior is cooperative. Thought Content:  Thought content normal.         Cognition and Memory: Cognition and memory normal.         Judgment: Judgment normal.           ASSESSMENT and PLAN  Diagnoses and all orders for this visit:    1. Acute pain of both knees  Comments:  from fall on knees. \"it feels full\" no fluid seen. toradol with pepcid , send to ortho and lyrica (he has taken before with good effect)   Orders:  -     REFERRAL TO ORTHOPEDICS  -     famotidine (PEPCID) 40 mg tablet; Take 1 Tablet by mouth two (2) times a day. -     ketorolac (TORADOL) 10 mg tablet; Take 1 Tablet by mouth every six (6) hours as needed for Pain. -     pregabalin (LYRICA) 200 mg capsule; Take 1 Capsule by mouth two (2) times daily as needed for Pain. Max Daily Amount: 400 mg.    2. Functional diarrhea  Comments:  limotil \"it worked last time\" per pt   Orders:  -     diphenoxylate-atropine (LomotiL) 2.5-0.025 mg per tablet; Take 1 Tablet by mouth four (4) times daily as needed for Diarrhea. Max Daily Amount: 4 Tablets.  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

## 2021-06-21 NOTE — PROGRESS NOTES
1. Have you been to the ER, urgent care clinic since your last visit? Hospitalized since your last visit? No    2. Have you seen or consulted any other health care providers outside of the [address] [place] [address] since your last visit? Include any pap smears or colon screening.  No

## 2021-06-22 ENCOUNTER — TELEPHONE (OUTPATIENT)
Dept: PRIMARY CARE CLINIC | Age: 86
End: 2021-06-22

## 2021-06-22 NOTE — TELEPHONE ENCOUNTER
Pt wife called the famotidine and the fiorcet needs to go to [facility] on [address] not to [facility] since he needs these please re send

## 2021-07-26 DIAGNOSIS — R51.9 HEADACHE DISORDER: Chronic | ICD-10-CM

## 2021-07-26 RX ORDER — BUTALBITAL, ACETAMINOPHEN AND CAFFEINE 50; 325; 40 MG/1; MG/1; MG/1
TABLET ORAL
Qty: 60 TABLET | Refills: 0 | Status: SHIPPED | OUTPATIENT
Start: 2021-07-26

## 2021-08-30 ENCOUNTER — OFFICE VISIT (OUTPATIENT)
Dept: PRIMARY CARE CLINIC | Age: 86
End: 2021-08-30
Payer: MEDICARE

## 2021-08-30 VITALS — BODY MASS INDEX: 23.04 KG/M2 | TEMPERATURE: 97.5 F | HEIGHT: 68 IN | WEIGHT: 152 LBS

## 2021-08-30 DIAGNOSIS — R30.0 DYSURIA: ICD-10-CM

## 2021-08-30 DIAGNOSIS — N40.1 BENIGN PROSTATIC HYPERPLASIA WITH NOCTURIA: ICD-10-CM

## 2021-08-30 DIAGNOSIS — F51.01 PRIMARY INSOMNIA: Chronic | ICD-10-CM

## 2021-08-30 DIAGNOSIS — R35.1 BENIGN PROSTATIC HYPERPLASIA WITH NOCTURIA: ICD-10-CM

## 2021-08-30 DIAGNOSIS — J01.10 ACUTE FRONTAL SINUSITIS, RECURRENCE NOT SPECIFIED: Primary | ICD-10-CM

## 2021-08-30 PROCEDURE — G8420 CALC BMI NORM PARAMETERS: HCPCS | Performed by: NURSE PRACTITIONER

## 2021-08-30 PROCEDURE — G8427 DOCREV CUR MEDS BY ELIG CLIN: HCPCS | Performed by: NURSE PRACTITIONER

## 2021-08-30 PROCEDURE — 99214 OFFICE O/P EST MOD 30 MIN: CPT | Performed by: NURSE PRACTITIONER

## 2021-08-30 PROCEDURE — 1101F PT FALLS ASSESS-DOCD LE1/YR: CPT | Performed by: NURSE PRACTITIONER

## 2021-08-30 PROCEDURE — G8536 NO DOC ELDER MAL SCRN: HCPCS | Performed by: NURSE PRACTITIONER

## 2021-08-30 PROCEDURE — G8432 DEP SCR NOT DOC, RNG: HCPCS | Performed by: NURSE PRACTITIONER

## 2021-08-30 RX ORDER — AMOXICILLIN AND CLAVULANATE POTASSIUM 875; 125 MG/1; MG/1
1 TABLET, FILM COATED ORAL 2 TIMES DAILY
Qty: 20 TABLET | Refills: 0 | Status: SHIPPED | OUTPATIENT
Start: 2021-08-30 | End: 2021-09-09

## 2021-08-30 RX ORDER — TAMSULOSIN HYDROCHLORIDE 0.4 MG/1
0.4 CAPSULE ORAL DAILY
Qty: 90 CAPSULE | Refills: 1 | Status: SHIPPED | OUTPATIENT
Start: 2021-08-30 | End: 2021-09-13

## 2021-08-30 RX ORDER — ZOLPIDEM TARTRATE 10 MG/1
10 TABLET ORAL
Qty: 90 TABLET | Refills: 1 | Status: SHIPPED | OUTPATIENT
Start: 2021-08-30

## 2021-08-30 NOTE — PROGRESS NOTES
1. Have you been to the ER, urgent care clinic since your last visit? Hospitalized since your last visit? No    2. Have you seen or consulted any other health care providers outside of the [address] [place] [address] since your last visit? Include any pap smears or colon screening.  No   Visit Vitals  Temp 97.5 °F (36.4 °C) (Axillary)   Ht 5' 8\" (1.727 m)   Wt 152 lb (68.9 kg)   BMI 23.11 kg/m²     Chief Complaint   Patient presents with    Flu Like Symptoms

## 2021-08-30 NOTE — PROGRESS NOTES
HISTORY OF PRESENT ILLNESS  Sandy Canales is a 80 y.o. male presents for   Chief Complaint   Patient presents with    Flu Like Symptoms    sinus pressure for a bout  Week    Hx of COPD ; has history of similar sinus infections    Subjective febrile symptoms cold/chills    Tickle in throat. Follow up on insomnia/Sleep disturbances    Patient is taking : [x] Ambien []Lunesta [] Amitriptyline []Trazodone []Melatonin []Benadryl []Restoril []remeron []mirtazipine []Doxepin []Seroquel []Xanax    No Changes in sleep patterns and describes sleeping their normal amounts. Follow up on BPH does well on flomax    Vitals:    08/30/21 1541   Temp: 97.5 °F (36.4 °C)   TempSrc: Axillary   Height: 5' 8\" (1.727 m)   Weight: 152 lb (68.9 kg)      Patient Active Problem List   Diagnosis Code    GI (gastrointestinal bleed) K92.2    Acute posthemorrhagic anemia D62    Hypotension, unspecified I95.9    BRETT (acute kidney injury) ([facility] [address] N17.9    Acquired hypothyroidism E03.9    Chest pain, unspecified R07.9    Peptic ulcer disease with hemorrhage K27.4    Essential (primary) hypertension I10    Headache disorder R51.9    Primary insomnia F51.01    Benign prostatic hyperplasia N40.0    Mixed hyperlipidemia E78.2     Patient Active Problem List    Diagnosis Date Noted    Mixed hyperlipidemia 03/08/2021    Essential (primary) hypertension 09/21/2020    Headache disorder 09/21/2020    Primary insomnia 09/21/2020    Benign prostatic hyperplasia 09/21/2020    Peptic ulcer disease with hemorrhage     GI (gastrointestinal bleed) 10/07/2011    Acute posthemorrhagic anemia 10/07/2011    Hypotension, unspecified 10/07/2011    BRETT (acute kidney injury) ([facility] [address] 10/07/2011    Acquired hypothyroidism 10/07/2011    Chest pain, unspecified 10/07/2011     Current Outpatient Medications   Medication Sig Dispense Refill    zolpidem (AMBIEN) 10 mg tablet Take 1 Tablet by mouth nightly as needed for Sleep.  Max Daily Amount: 10 mg. 90 Tablet 1    tamsulosin (FLOMAX) 0.4 mg capsule Take 1 Capsule by mouth daily. 90 Capsule 1    amoxicillin-clavulanate (AUGMENTIN) 875-125 mg per tablet Take 1 Tablet by mouth two (2) times a day for 10 days. 20 Tablet 0    butalbital-acetaminophen-caffeine (FIORICET, ESGIC) -40 mg per tablet take 1 tablet by mouth every 6 hours if needed for headache 60 Tablet 0    famotidine (PEPCID) 40 mg tablet Take 1 Tablet by mouth two (2) times a day. 30 Tablet 0    diphenoxylate-atropine (LomotiL) 2.5-0.025 mg per tablet Take 1 Tablet by mouth four (4) times daily as needed for Diarrhea. Max Daily Amount: 4 Tablets. 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 20 Tablet 0    pregabalin (LYRICA) 200 mg capsule Take 1 Capsule by mouth two (2) times daily as needed for Pain. Max Daily Amount: 400 mg. 40 Capsule 0    amLODIPine (NORVASC) 5 mg tablet TAKE 1 TABLET BY MOUTH  DAILY 90 Tablet 0    fexofenadine (ALLEGRA) 180 mg tablet Take 1 Tab by mouth nightly.  30 Tab 2    fluticasone propionate (FLONASE) 50 mcg/actuation nasal spray SPRAY 1 SPRAY INTO EACH  NOSTRIL EVERY DAY 32 g 5    lisinopriL (PRINIVIL, ZESTRIL) 10 mg tablet TAKE 1 TABLET BY MOUTH  DAILY 90 Tab 1    levothyroxine (SYNTHROID) 50 mcg tablet TAKE 1 TABLET BY MOUTH  DAILY 90 Tab 1    fenofibrate (LOFIBRA) 160 mg tablet TAKE 1 TABLET BY MOUTH  DAILY 90 Tab 1    latanoprost (XALATAN) 0.005 % ophthalmic solution       timolol (TIMOPTIC) 0.5 % ophthalmic solution        Allergies   Allergen Reactions    Aspirin Other (comments)     Ulcers (bleeding)    Nsaids (Non-Steroidal Anti-Inflammatory Drug) Other (comments)     Ulcers       Past Medical History:   Diagnosis Date    CAD (coronary artery disease)     hypercholesterolemia    Cancer (HCC)     Prostate    Functional diarrhea     hx of partial gastrectomy    GERD (gastroesophageal reflux disease)     Hypercholesteremia     Hypertension     Hypothyroid     Migraines     Peptic ulcer disease with hemorrhage  6 years ago    Had gastric surgery in [place]     Past Surgical History:   Procedure Laterality Date    HX GASTRECTOMY  2006    Partial gastectmy    DE ABDOMEN SURGERY PROC UNLISTED       Family History   Problem Relation Age of Onset    Heart Attack Other      Social History     Tobacco Use    Smoking status: Former Smoker    Smokeless tobacco: Never Used   Substance Use Topics    Alcohol use: No           Review of Systems   Constitutional: Positive for chills. Negative for fever. HENT: Positive for sinus pain. Respiratory: Negative for cough and shortness of breath. Cardiovascular: Negative for chest pain and palpitations. Gastrointestinal: Negative for constipation and diarrhea. Neurological: Negative for dizziness. Psychiatric/Behavioral: Negative for depression. The patient is not nervous/anxious and does not have insomnia. Physical Exam  Constitutional:       General: He is not in acute distress. Appearance: Normal appearance. He is not ill-appearing. Pulmonary:      Effort: Pulmonary effort is normal. No respiratory distress. Neurological:      Mental Status: He is alert. ASSESSMENT and PLAN  Diagnoses and all orders for this visit:    1. Acute frontal sinusitis, recurrence not specified  -     NOVEL CORONAVIRUS (COVID-19)  -     amoxicillin-clavulanate (AUGMENTIN) 875-125 mg per tablet; Take 1 Tablet by mouth two (2) times a day for 10 days. 2. Primary insomnia  -     zolpidem (AMBIEN) 10 mg tablet; Take 1 Tablet by mouth nightly as needed for Sleep. Max Daily Amount: 10 mg.    3. Benign prostatic hyperplasia with nocturia    4. Primary insomnia  Comments:  ok on ambien X yrs  Orders:  -     zolpidem (AMBIEN) 10 mg tablet; Take 1 Tablet by mouth nightly as needed for Sleep.  Max Daily Amount: 10 mg.    5. Dysuria  -     tamsulosin (FLOMAX) 0.4 mg capsule; Take 1 Capsule by mouth daily. Amilcar Green NP         This office note has been dictated using voice capture. Despite evaluating for errors, syntax errors could be present.

## 2021-09-01 LAB — SARS-COV-2, NAA: NOT DETECTED

## 2021-09-08 DIAGNOSIS — I10 ESSENTIAL (PRIMARY) HYPERTENSION: Chronic | ICD-10-CM

## 2021-09-08 DIAGNOSIS — R30.0 DYSURIA: ICD-10-CM

## 2021-09-13 RX ORDER — AMLODIPINE BESYLATE 5 MG/1
TABLET ORAL
Qty: 90 TABLET | Refills: 3 | Status: SHIPPED | OUTPATIENT
Start: 2021-09-13

## 2021-09-13 RX ORDER — PANTOPRAZOLE SODIUM 40 MG/1
TABLET, DELAYED RELEASE ORAL
Qty: 90 TABLET | Refills: 3 | Status: SHIPPED | OUTPATIENT
Start: 2021-09-13

## 2021-09-13 RX ORDER — TAMSULOSIN HYDROCHLORIDE 0.4 MG/1
0.4 CAPSULE ORAL DAILY
Qty: 90 CAPSULE | Refills: 3 | Status: SHIPPED | OUTPATIENT
Start: 2021-09-13

## 2021-11-02 ENCOUNTER — TELEPHONE (OUTPATIENT)
Dept: PRIMARY CARE CLINIC | Age: 86
End: 2021-11-02

## 2022-07-07 DIAGNOSIS — I10 ESSENTIAL (PRIMARY) HYPERTENSION: Chronic | ICD-10-CM

## 2022-07-07 DIAGNOSIS — R30.0 DYSURIA: ICD-10-CM

## 2022-07-11 RX ORDER — TAMSULOSIN HYDROCHLORIDE 0.4 MG/1
0.4 CAPSULE ORAL DAILY
Qty: 90 CAPSULE | Refills: 3 | OUTPATIENT
Start: 2022-07-11

## 2022-07-11 RX ORDER — AMLODIPINE BESYLATE 5 MG/1
5 TABLET ORAL DAILY
Qty: 90 TABLET | Refills: 3 | OUTPATIENT
Start: 2022-07-11
